# Patient Record
Sex: FEMALE | Race: WHITE | NOT HISPANIC OR LATINO | Employment: FULL TIME | URBAN - METROPOLITAN AREA
[De-identification: names, ages, dates, MRNs, and addresses within clinical notes are randomized per-mention and may not be internally consistent; named-entity substitution may affect disease eponyms.]

---

## 2017-01-19 RX ORDER — EZETIMIBE 10 MG/1
10 TABLET ORAL DAILY
Qty: 90 TAB | Refills: 3 | Status: SHIPPED | OUTPATIENT
Start: 2017-01-19 | End: 2017-11-29 | Stop reason: SDUPTHER

## 2017-03-27 DIAGNOSIS — F51.01 PRIMARY INSOMNIA: ICD-10-CM

## 2017-03-27 RX ORDER — ALPRAZOLAM 1 MG/1
1 TABLET ORAL NIGHTLY PRN
Qty: 30 TAB | Refills: 0 | Status: SHIPPED
Start: 2017-03-27 | End: 2017-03-28 | Stop reason: SDUPTHER

## 2017-03-27 NOTE — TELEPHONE ENCOUNTER
Was the patient seen in the last year in this department? Yes  refill 12/15/16    Does patient have an active prescription for medications requested? No     Received Request Via: Patient

## 2017-04-07 ENCOUNTER — HOSPITAL ENCOUNTER (OUTPATIENT)
Dept: RADIOLOGY | Facility: MEDICAL CENTER | Age: 57
End: 2017-04-07
Attending: FAMILY MEDICINE
Payer: COMMERCIAL

## 2017-04-07 DIAGNOSIS — R74.8 ELEVATED LIVER ENZYMES: ICD-10-CM

## 2017-04-07 PROCEDURE — 76705 ECHO EXAM OF ABDOMEN: CPT

## 2017-05-11 ENCOUNTER — APPOINTMENT (OUTPATIENT)
Dept: MEDICAL GROUP | Facility: LAB | Age: 57
End: 2017-05-11
Payer: COMMERCIAL

## 2017-06-02 RX ORDER — ALPRAZOLAM 1 MG/1
TABLET ORAL
Qty: 30 TAB | Refills: 0 | Status: SHIPPED
Start: 2017-06-02 | End: 2017-07-17 | Stop reason: SDUPTHER

## 2017-06-02 NOTE — TELEPHONE ENCOUNTER
Was the patient seen in the last year in this department? Yes  last Fill 3/27/17 #30    Does patient have an active prescription for medications requested? No     Received Request Via: Pharmacy

## 2017-06-07 ENCOUNTER — APPOINTMENT (OUTPATIENT)
Dept: MEDICAL GROUP | Facility: LAB | Age: 57
End: 2017-06-07
Payer: COMMERCIAL

## 2017-06-14 ENCOUNTER — HOSPITAL ENCOUNTER (OUTPATIENT)
Dept: LAB | Facility: MEDICAL CENTER | Age: 57
End: 2017-06-14
Attending: FAMILY MEDICINE
Payer: COMMERCIAL

## 2017-06-14 DIAGNOSIS — R74.8 ELEVATED LIVER ENZYMES: ICD-10-CM

## 2017-06-14 LAB
ALBUMIN SERPL BCP-MCNC: 4.3 G/DL (ref 3.2–4.9)
ALBUMIN/GLOB SERPL: 1.5 G/DL
ALP SERPL-CCNC: 49 U/L (ref 30–99)
ALT SERPL-CCNC: 94 U/L (ref 2–50)
ANION GAP SERPL CALC-SCNC: 10 MMOL/L (ref 0–11.9)
AST SERPL-CCNC: 68 U/L (ref 12–45)
BILIRUB SERPL-MCNC: 1.2 MG/DL (ref 0.1–1.5)
BUN SERPL-MCNC: 12 MG/DL (ref 8–22)
CALCIUM SERPL-MCNC: 9.5 MG/DL (ref 8.5–10.5)
CHLORIDE SERPL-SCNC: 104 MMOL/L (ref 96–112)
CO2 SERPL-SCNC: 24 MMOL/L (ref 20–33)
CREAT SERPL-MCNC: 0.77 MG/DL (ref 0.5–1.4)
GFR SERPL CREATININE-BSD FRML MDRD: >60 ML/MIN/1.73 M 2
GGT SERPL-CCNC: 67 U/L (ref 7–34)
GLOBULIN SER CALC-MCNC: 2.9 G/DL (ref 1.9–3.5)
GLUCOSE SERPL-MCNC: 91 MG/DL (ref 65–99)
HAV IGM SERPL QL IA: NEGATIVE
HBV CORE IGM SER QL: NEGATIVE
HBV SURFACE AG SER QL: NEGATIVE
HCV AB SER QL: NEGATIVE
POTASSIUM SERPL-SCNC: 3.8 MMOL/L (ref 3.6–5.5)
PROT SERPL-MCNC: 7.2 G/DL (ref 6–8.2)
SODIUM SERPL-SCNC: 138 MMOL/L (ref 135–145)

## 2017-06-14 PROCEDURE — 36415 COLL VENOUS BLD VENIPUNCTURE: CPT

## 2017-06-14 PROCEDURE — 80053 COMPREHEN METABOLIC PANEL: CPT

## 2017-06-14 PROCEDURE — 80074 ACUTE HEPATITIS PANEL: CPT

## 2017-06-14 PROCEDURE — 82977 ASSAY OF GGT: CPT

## 2017-06-15 DIAGNOSIS — E78.00 ELEVATED LDL CHOLESTEROL LEVEL: ICD-10-CM

## 2017-06-20 ENCOUNTER — OFFICE VISIT (OUTPATIENT)
Dept: MEDICAL GROUP | Facility: LAB | Age: 57
End: 2017-06-20
Payer: COMMERCIAL

## 2017-06-20 VITALS
BODY MASS INDEX: 26.83 KG/M2 | HEIGHT: 68 IN | HEART RATE: 88 BPM | OXYGEN SATURATION: 98 % | TEMPERATURE: 97.7 F | DIASTOLIC BLOOD PRESSURE: 64 MMHG | WEIGHT: 177 LBS | SYSTOLIC BLOOD PRESSURE: 108 MMHG | RESPIRATION RATE: 12 BRPM

## 2017-06-20 DIAGNOSIS — K75.81 STEATOHEPATITIS, NON-ALCOHOLIC: ICD-10-CM

## 2017-06-20 DIAGNOSIS — E78.2 MIXED HYPERLIPIDEMIA: ICD-10-CM

## 2017-06-20 DIAGNOSIS — Z00.00 HEALTH MAINTENANCE EXAMINATION: ICD-10-CM

## 2017-06-20 DIAGNOSIS — F10.90 ALCOHOL USE DISORDER: ICD-10-CM

## 2017-06-20 DIAGNOSIS — R74.8 ELEVATED LIVER ENZYMES: ICD-10-CM

## 2017-06-20 PROCEDURE — 99396 PREV VISIT EST AGE 40-64: CPT | Performed by: FAMILY MEDICINE

## 2017-06-20 RX ORDER — NALTREXONE HYDROCHLORIDE 50 MG/1
50 TABLET, FILM COATED ORAL DAILY
Qty: 30 TAB | Refills: 0 | Status: SHIPPED | OUTPATIENT
Start: 2017-06-20 | End: 2017-09-05

## 2017-06-20 NOTE — PROGRESS NOTES
Subjective:   Garret Crespo is a 57 y.o. female here today for   Chief Complaint   Patient presents with   • Results       1. Health maintenance examination  Pap: Last done 2015, up to date  Mammogram: Last done 12/2016, up to date  Colonoscopy: Last done 2012, up to date  Tetanus booster: Last done 2015  Pneumonia vaccine: Not done  Shingles vaccine: Not done   Flu vaccine: Done yearly   Diet: healthy   Exercise: rare   Vitamin D: Not taking    2. Mixed hyperlipidemia  This is chronic. Doing well.  Taking Zetia 10 mg daily as prescribed. No myalgias, GI upset, or other side effects from medication. Denies CP or stroke symptoms. Also taking a daily ASA. Last lipid panel 12/2017 and was reviewed with the patient.      3. Elevated liver enzymes  4. Steatohepatitis, non-alcoholic  5. Alcohol use disorder  This is chronic. Patient has had a long history of liver function tests have gone up and down. She has been an intermittent very heavy drinker. Until February, she was drinking 4 glasses of wine and hard alcohol every night. She decreased that to 2 glasses of wine every night. Sometimes more. She did not go through any withdrawal with this. She denies any jaundice, abdominal pain, or increasing abdominal circumference. She feels as though she is in a better place emotionally. She does still have very strong alcohol cravings.    Results for GARRET CRESPO (MRN [id number]) as of 6/20/2017 07:23   6/14/2017 16:00   Sodium 138   Potassium 3.8   Chloride 104   Co2 24   Anion Gap 10.0   Glucose 91   Bun 12   Creatinine 0.77   GFR If  >60   GFR If Non African American >60   Calcium 9.5   AST(SGOT) 68 (H)   ALT(SGPT) 94 (H)   Alkaline Phosphatase 49   Total Bilirubin 1.2   Albumin 4.3   Total Protein 7.2   Globulin 2.9   A-G Ratio 1.5   Gamma Gt 67 (H)   Hepatitis A Virus Ab, IgM Negative   Hepatitis B Surface Antigen Negative   Hepatitis B Cors Ab,IgM Negative   Hepatitis C Antibody Negative  "    4/7/17 US     Persistent marked increased hepatic echotexture which suggests fatty infiltration. Stable focal hypoechoic area surrounding the gallbladder fossa, which likely represents fatty sparing.          Current medicines (including changes today)  Current Outpatient Prescriptions   Medication Sig Dispense Refill   • naltrexone (DEPADE) 50 MG Tab Take 1 Tab by mouth every day. 30 Tab 0   • alprazolam (XANAX) 1 MG Tab TAKE ONE TABLET BY MOUTH AT BEDTIME AS NEEDED FOR SLEEP 30 Tab 0   • ezetimibe (ZETIA) 10 MG Tab Take 1 Tab by mouth every day. 90 Tab 3   • fluticasone (FLONASE) 50 MCG/ACT nasal spray Spray 1 Spray in nose every day. 16 g 11   • Flaxseed, Linseed, (FLAX SEEDS PO) Take  by mouth.     • vitamin D (CHOLECALCIFEROL) 1000 UNIT TABS Take 1,000 Units by mouth two times a day may change times on alt/days.       No current facility-administered medications for this visit.     She  has a past medical history of Hyperlipidemia (8/3/2011); Solar keratosis (8/3/2011); Dyspnea (3/22/2012); GERD (gastroesophageal reflux disease) (3/22/2012); Insomnia (12/5/2014); and Mixed hyperlipidemia (7/27/2015). She also has no past medical history of Breast cancer (CMS-HCC).    ROS   No fevers  No bowel changes  No LE edema       Objective:     Blood pressure 108/64, pulse 88, temperature 36.5 °C (97.7 °F), resp. rate 12, height 1.727 m (5' 7.99\"), weight 80.287 kg (177 lb), SpO2 98 %. Body mass index is 26.92 kg/(m^2).   Physical Exam:  Constitutional: Alert, no distress.  Skin: Warm, dry, good turgor, no rashes in visible areas.  Eye: Equal, round and reactive, conjunctiva clear, lids normal.  ENMT: Lips without lesions, good dentition, oropharynx clear.  Neck: Trachea midline, no masses, no thyromegaly. No cervical or supraclavicular lymphadenopathy  Respiratory: Unlabored respiratory effort, lungs clear to auscultation, no wheezes, no ronchi.  Cardiovascular: Normal S1, S2, RRR, no murmur, no edema.  Abdomen: " Soft, non-tender, no masses, no hepatosplenomegaly.  Psych: Alert and oriented x3, normal affect and mood.      Assessment and Plan:   The following treatment plan was discussed    1. Health maintenance examination  Restart vitamin D 2000 units daily  Diet and exercise discussed  Age appropriate counseling given  Healthcare maintenance is up-to-date    2. Mixed hyperlipidemia  Chronic, unstable repeat labs have been ordered and she will go get these done fasting  For now, continue Zetia  She does have a statin intolerance  Dietary recommendations given    3. Elevated liver enzymes  4. Steatohepatitis, non-alcoholic  Chronic, improving  Discussed the importance of alcohol cessation as this is the most likely cause. If these continue to remain elevated, further investigation will commence to look for other etiologies such as autoimmune hepatitis, viral causes.  Follow-up labs in 6 months    5. Alcohol use disorder  Chronic, improving  Prescription of naltrexone given to help with cravings  - naltrexone (DEPADE) 50 MG Tab; Take 1 Tab by mouth every day.  Dispense: 30 Tab; Refill: 0      Followup: Return in about 2 months (around 8/20/2017) for alcohol dependence, cholesterol .       This note was created using voice recognition software. I have made every reasonable attempt to correct errors, however, I do anticipate some grammatical errors.

## 2017-06-20 NOTE — MR AVS SNAPSHOT
"        Beatriz Crespo   2017 7:00 AM   Office Visit   MRN: [id number]    Department:  [facility]   Dept Phone:  [phone]    Description:  Female : 1960   Provider:  Sirisha Rodríguez M.D.           Reason for Visit     Results           Allergies as of 2017     Allergen Noted Reactions    Percocet [Oxycodone-Acetaminophen] 2011       Itch     Statins [Hmg-Coa-R Inhibitors] 10/06/2016         You were diagnosed with     Health maintenance examination   [429132]       Mixed hyperlipidemia   [272.2.ICD-9-CM]       Elevated liver enzymes   [084309]       Steatohepatitis, non-alcoholic   [266001]       Alcohol use disorder   [5810580]         Vital Signs     Blood Pressure Pulse Temperature Respirations Height Weight    108/64 mmHg 88 36.5 °C (97.7 °F) 12 1.727 m (5' 7.99\") 80.287 kg (177 lb)    Body Mass Index Oxygen Saturation Smoking Status             26.92 kg/m2 98% Former Smoker         Basic Information     Date Of Birth Sex Race Ethnicity Preferred Language    1960 Female White Non- English      Your appointments     Aug 23, 2017  7:00 AM   Established Patient with Sirisha Rodríguez M.D.   [facility] (--)    [address] [address] [id number] [phone]           You will be receiving a confirmation call a few days before your appointment from our automated call confirmation system.              Problem List              ICD-10-CM Priority Class Noted - Resolved    Solar keratosis L57.0, X32.XXXA   8/3/2011 - Present    Preventative health care Z00.00   2011 - Present    Dyspnea R06.00   3/22/2012 - Present    GERD (gastroesophageal reflux disease) K21.9   3/22/2012 - Present    Chronic sinus complaints R09.89   2013 - Present    Steatohepatitis, non-alcoholic K75.81   2014 - Present    Impaired fasting glucose R73.01   3/3/2014 - Present    Insomnia G47.00   2014 - Present   " Menopause Z78.0   12/6/2014 - Present    Lower back pain M54.5   1/5/2015 - Present    Eustachian tube dysfunction H69.80   1/5/2015 - Present    Statin intolerance Z78.9   7/27/2015 - Present    Mixed hyperlipidemia E78.2   7/27/2015 - Present    Moderate single current episode of major depressive disorder (CMS-HCC) F32.1   10/12/2016 - Present    Elevated liver enzymes R74.8   12/15/2016 - Present    Alcohol use disorder F10.99   12/15/2016 - Present      Health Maintenance        Date Due Completion Dates    PAP SMEAR 7/13/2018 7/13/2015, 6/26/2014 (Done)    Override on 6/26/2014: Done    MAMMOGRAM 12/2/2018 12/2/2016, 7/13/2015, 7/3/2014, 6/25/2014, 4/22/2013, 4/3/2012    COLONOSCOPY 4/6/2022 4/6/2012 (Done)    Override on 4/6/2012: Done    IMM DTaP/Tdap/Td Vaccine (2 - Td) 9/9/2025 9/9/2015            Current Immunizations     Tdap Vaccine 9/9/2015      Below and/or attached are the medications your provider expects you to take. Review all of your home medications and newly ordered medications with your provider and/or pharmacist. Follow medication instructions as directed by your provider and/or pharmacist. Please keep your medication list with you and share with your provider. Update the information when medications are discontinued, doses are changed, or new medications (including over-the-counter products) are added; and carry medication information at all times in the event of emergency situations     Allergies:  PERCOCET - (reactions not documented)     STATINS - (reactions not documented)               Medications  Valid as of: June 20, 2017 -  7:21 AM    Generic Name Brand Name Tablet Size Instructions for use    ALPRAZolam (Tab) XANAX 1 MG TAKE ONE TABLET BY MOUTH AT BEDTIME AS NEEDED FOR SLEEP        Cholecalciferol (Tab) cholecalciferol 1000 UNIT Take 1,000 Units by mouth two times a day may change times on alt/days.        Ezetimibe (Tab) ZETIA 10 MG Take 1 Tab by mouth every day.        Flaxseed  (Linseed)   Take  by mouth.        Fluticasone Propionate (Suspension) FLONASE 50 MCG/ACT Spray 1 Spray in nose every day.        Naltrexone HCl (Tab) DEPADE 50 MG Take 1 Tab by mouth every day.        .                 Medicines prescribed today were sent to:     [facility] [address] [place] [address], [place] - [address]    [address] [place] [address]    Phone: [phone] Fax: [phone]    Open 24 Hours?: No      Medication refill instructions:       If your prescription bottle indicates you have medication refills left, it is not necessary to call your provider’s office. Please contact your pharmacy and they will refill your medication.    If your prescription bottle indicates you do not have any refills left, you may request refills at any time through one of the following ways: The online [email or web address] system (except Urgent Care), by calling your provider’s office, or by asking your pharmacy to contact your provider’s office with a refill request. Medication refills are processed only during regular business hours and may not be available until the next business day. Your provider may request additional information or to have a follow-up visit with you prior to refilling your medication.   *Please Note: Medication refills are assigned a new Rx number when refilled electronically. Your pharmacy may indicate that no refills were authorized even though a new prescription for the same medication is available at the pharmacy. Please request the medicine by name with the pharmacy before contacting your provider for a refill.           [email or web address] Access Code: Activation code not generated  Current [email or web address] Status: Active

## 2017-07-17 RX ORDER — ALPRAZOLAM 1 MG/1
TABLET ORAL
Qty: 30 TAB | Refills: 0 | Status: CANCELLED
Start: 2017-07-17

## 2017-07-17 RX ORDER — ALPRAZOLAM 1 MG/1
TABLET ORAL
Qty: 30 TAB | Refills: 0 | Status: SHIPPED
Start: 2017-07-17 | End: 2017-10-18 | Stop reason: SDUPTHER

## 2017-07-17 NOTE — TELEPHONE ENCOUNTER
Was the patient seen in the last year in this department? Yes  refill 6/2/17    Does patient have an active prescription for medications requested? No     Received Request Via: Patient

## 2017-09-05 ENCOUNTER — HOSPITAL ENCOUNTER (EMERGENCY)
Facility: MEDICAL CENTER | Age: 57
End: 2017-09-05
Attending: EMERGENCY MEDICINE
Payer: COMMERCIAL

## 2017-09-05 ENCOUNTER — APPOINTMENT (OUTPATIENT)
Dept: RADIOLOGY | Facility: MEDICAL CENTER | Age: 57
End: 2017-09-05
Attending: EMERGENCY MEDICINE
Payer: COMMERCIAL

## 2017-09-05 VITALS
HEART RATE: 89 BPM | SYSTOLIC BLOOD PRESSURE: 113 MMHG | WEIGHT: 173.28 LBS | RESPIRATION RATE: 18 BRPM | HEIGHT: 68 IN | TEMPERATURE: 97.7 F | BODY MASS INDEX: 26.26 KG/M2 | DIASTOLIC BLOOD PRESSURE: 83 MMHG

## 2017-09-05 DIAGNOSIS — S09.90XA CHI (CLOSED HEAD INJURY), INITIAL ENCOUNTER: ICD-10-CM

## 2017-09-05 DIAGNOSIS — R42 DIZZINESS: ICD-10-CM

## 2017-09-05 DIAGNOSIS — S09.90XA HEAD INJURY DUE TO TRAUMA, INITIAL ENCOUNTER: Primary | ICD-10-CM

## 2017-09-05 DIAGNOSIS — R56.9 SEIZURE (HCC): ICD-10-CM

## 2017-09-05 DIAGNOSIS — R26.89 BALANCE PROBLEMS: ICD-10-CM

## 2017-09-05 LAB
ALBUMIN SERPL BCP-MCNC: 4 G/DL (ref 3.2–4.9)
ALBUMIN/GLOB SERPL: 1.3 G/DL
ALP SERPL-CCNC: 50 U/L (ref 30–99)
ALT SERPL-CCNC: 78 U/L (ref 2–50)
ANION GAP SERPL CALC-SCNC: 10 MMOL/L (ref 0–11.9)
AST SERPL-CCNC: 59 U/L (ref 12–45)
BASOPHILS # BLD AUTO: 0.6 % (ref 0–1.8)
BASOPHILS # BLD: 0.03 K/UL (ref 0–0.12)
BILIRUB SERPL-MCNC: 0.9 MG/DL (ref 0.1–1.5)
BUN SERPL-MCNC: 14 MG/DL (ref 8–22)
CALCIUM SERPL-MCNC: 9.4 MG/DL (ref 8.4–10.2)
CHLORIDE SERPL-SCNC: 106 MMOL/L (ref 96–112)
CO2 SERPL-SCNC: 22 MMOL/L (ref 20–33)
CREAT SERPL-MCNC: 0.87 MG/DL (ref 0.5–1.4)
EOSINOPHIL # BLD AUTO: 0.13 K/UL (ref 0–0.51)
EOSINOPHIL NFR BLD: 2.5 % (ref 0–6.9)
ERYTHROCYTE [DISTWIDTH] IN BLOOD BY AUTOMATED COUNT: 39.3 FL (ref 35.9–50)
GFR SERPL CREATININE-BSD FRML MDRD: >60 ML/MIN/1.73 M 2
GLOBULIN SER CALC-MCNC: 3.1 G/DL (ref 1.9–3.5)
GLUCOSE SERPL-MCNC: 122 MG/DL (ref 65–99)
HCT VFR BLD AUTO: 44.4 % (ref 37–47)
HGB BLD-MCNC: 15.5 G/DL (ref 12–16)
IMM GRANULOCYTES # BLD AUTO: 0.02 K/UL (ref 0–0.11)
IMM GRANULOCYTES NFR BLD AUTO: 0.4 % (ref 0–0.9)
INR PPP: 0.89 (ref 0.87–1.13)
LYMPHOCYTES # BLD AUTO: 1.67 K/UL (ref 1–4.8)
LYMPHOCYTES NFR BLD: 32.5 % (ref 22–41)
MCH RBC QN AUTO: 32.8 PG (ref 27–33)
MCHC RBC AUTO-ENTMCNC: 34.9 G/DL (ref 33.6–35)
MCV RBC AUTO: 93.9 FL (ref 81.4–97.8)
MONOCYTES # BLD AUTO: 0.46 K/UL (ref 0–0.85)
MONOCYTES NFR BLD AUTO: 8.9 % (ref 0–13.4)
NEUTROPHILS # BLD AUTO: 2.83 K/UL (ref 2–7.15)
NEUTROPHILS NFR BLD: 55.1 % (ref 44–72)
NRBC # BLD AUTO: 0 K/UL
NRBC BLD AUTO-RTO: 0 /100 WBC
PLATELET # BLD AUTO: 215 K/UL (ref 164–446)
PMV BLD AUTO: 10.1 FL (ref 9–12.9)
POTASSIUM SERPL-SCNC: 4.2 MMOL/L (ref 3.6–5.5)
PROT SERPL-MCNC: 7.1 G/DL (ref 6–8.2)
PROTHROMBIN TIME: 11.9 SEC (ref 12–14.6)
RBC # BLD AUTO: 4.73 M/UL (ref 4.2–5.4)
SODIUM SERPL-SCNC: 138 MMOL/L (ref 135–145)
TROPONIN I SERPL-MCNC: <0.02 NG/ML (ref 0–0.04)
WBC # BLD AUTO: 5.1 K/UL (ref 4.8–10.8)

## 2017-09-05 PROCEDURE — 85025 COMPLETE CBC W/AUTO DIFF WBC: CPT

## 2017-09-05 PROCEDURE — 85610 PROTHROMBIN TIME: CPT

## 2017-09-05 PROCEDURE — 93005 ELECTROCARDIOGRAM TRACING: CPT | Performed by: EMERGENCY MEDICINE

## 2017-09-05 PROCEDURE — 700117 HCHG RX CONTRAST REV CODE 255: Performed by: EMERGENCY MEDICINE

## 2017-09-05 PROCEDURE — 84484 ASSAY OF TROPONIN QUANT: CPT

## 2017-09-05 PROCEDURE — 80053 COMPREHEN METABOLIC PANEL: CPT

## 2017-09-05 PROCEDURE — 70496 CT ANGIOGRAPHY HEAD: CPT

## 2017-09-05 PROCEDURE — 99284 EMERGENCY DEPT VISIT MOD MDM: CPT

## 2017-09-05 RX ADMIN — IOHEXOL 100 ML: 350 INJECTION, SOLUTION INTRAVENOUS at 10:30

## 2017-09-05 ASSESSMENT — PAIN SCALES - WONG BAKER
WONGBAKER_NUMERICALRESPONSE: DOESN'T HURT AT ALL
WONGBAKER_NUMERICALRESPONSE: DOESN'T HURT AT ALL

## 2017-09-05 NOTE — ED NOTES
Pt amb to rm#3; c/o dizziness x5d. Pt staes that last Friday she experienced a new onset seizure while grocery shopping in [place]. Pt seen at local hospital at that time. Pt states cont dizziness post seizure.

## 2017-09-05 NOTE — DISCHARGE INSTRUCTIONS
Head Injury, Adult  You have received a head injury. It does not appear serious at this time. Headaches and vomiting are common following head injury. It should be easy to awaken from sleeping. Sometimes it is necessary for you to stay in the emergency department for a while for observation. Sometimes admission to the hospital may be needed. After injuries such as yours, most problems occur within the first 24 hours, but side effects may occur up to 7-10 days after the injury. It is important for you to carefully monitor your condition and contact your health care provider or seek immediate medical care if there is a change in your condition.  WHAT ARE THE TYPES OF HEAD INJURIES?  Head injuries can be as minor as a bump. Some head injuries can be more severe. More severe head injuries include:  · A jarring injury to the brain (concussion).  · A bruise of the brain (contusion). This mean there is bleeding in the brain that can cause swelling.  · A cracked skull (skull fracture).  · Bleeding in the brain that collects, clots, and forms a bump (hematoma).  WHAT CAUSES A HEAD INJURY?  A serious head injury is most likely to happen to someone who is in a car wreck and is not wearing a seat belt. Other causes of major head injuries include bicycle or motorcycle accidents, sports injuries, and falls.  HOW ARE HEAD INJURIES DIAGNOSED?  A complete history of the event leading to the injury and your current symptoms will be helpful in diagnosing head injuries. Many times, pictures of the brain, such as CT or MRI are needed to see the extent of the injury. Often, an overnight hospital stay is necessary for observation.   WHEN SHOULD I SEEK IMMEDIATE MEDICAL CARE?   You should get help right away if:  · You have confusion or drowsiness.  · You feel sick to your stomach (nauseous) or have continued, forceful vomiting.  · You have dizziness or unsteadiness that is getting worse.  · You have severe, continued headaches not  relieved by medicine. Only take over-the-counter or prescription medicines for pain, fever, or discomfort as directed by your health care provider.  · You do not have normal function of the arms or legs or are unable to walk.  · You notice changes in the black spots in the center of the colored part of your eye (pupil).  · You have a clear or bloody fluid coming from your nose or ears.  · You have a loss of vision.  During the next 24 hours after the injury, you must stay with someone who can watch you for the warning signs. This person should contact local emergency services (911 in the [place] if you have seizures, you become unconscious, or you are unable to wake up.  HOW CAN I PREVENT A HEAD INJURY IN THE FUTURE?  The most important factor for preventing major head injuries is avoiding motor vehicle accidents.  To minimize the potential for damage to your head, it is crucial to wear seat belts while riding in motor vehicles. Wearing helmets while bike riding and playing collision sports (like football) is also helpful. Also, avoiding dangerous activities around the house will further help reduce your risk of head injury.   WHEN CAN I RETURN TO NORMAL ACTIVITIES AND ATHLETICS?  You should be reevaluated by your health care provider before returning to these activities. If you have any of the following symptoms, you should not return to activities or contact sports until 1 week after the symptoms have stopped:  · Persistent headache.  · Dizziness or vertigo.  · Poor attention and concentration.  · Confusion.  · Memory problems.  · Nausea or vomiting.  · Fatigue or tire easily.  · Irritability.  · Intolerant of bright lights or loud noises.  · Anxiety or depression.  · Disturbed sleep.  MAKE SURE YOU:   · Understand these instructions.  · Will watch your condition.  · Will get help right away if you are not doing well or get worse.     This information is not intended to replace advice given to you by your health  care provider. Make sure you discuss any questions you have with your health care provider.     Document Released: 12/18/2006 Document Revised: 01/08/2016 Document Reviewed: 08/25/2014  [employer] Interactive Patient Education ©2016 [employer]        Ataxia  You have an unsteady walk called ataxia. Your condition may require further tests.  Ataxia can be caused by:  · Neurological conditions.  · Infections.  · Physical exhaustion.  · Internal bleeding.  · Alcohol intoxication, or medication side effects.  · Problems with circulation, blood pressure, and heart disease can also make you unsteady.  Laboratory and X-ray tests may be needed.   Treatment for now:  · Get plenty of rest and eat a nutritious diet over the next weeks.  · Avoid alcohol.  · If you become very unsteady, dizzy, nauseated, or feel like you are going to faint, lie down flat right away.  · Wait until all your symptoms pass before you get up again.  SEEK IMMEDIATE MEDICAL CARE IF:  · You develop severe unsteadiness, headache, chest pain, or abdominal pain.  · You have weakness or numbness on one side of your body.  · You have problems with your vision.  · You develop confusion or difficulty speaking.  · You have a fever, chills, or an irregular heartbeat or a very fast pulse.  MAKE SURE YOU:   · Understand these instructions.  · Will watch your condition.  · Will get help right away if you are not doing well or get worse.  Document Released: 12/18/2006 Document Revised: 03/11/2013 Document Reviewed: 06/05/2008  [employer]® Patient Information ©2014 [employer].

## 2017-09-05 NOTE — ED PROVIDER NOTES
"ED Provider Note    ED Provider Note      Primary care provider: Sirisha Rodríguez M.D.  Means of arrival: POV  History obtained from: Patient  History limited by: None    CHIEF COMPLAINT  Chief Complaint   Patient presents with   • Dizziness       HPI  Beatriz Crespo is a 57 y.o. female who presents to the Emergency Department with a chief complaint of dizziness. Patient states that last Friday, she was in [place] she was [place] all day and after she left at approximately 1 PM she stopped at a store to get something to eat she was buying some lettuce and the next thing she knows she was riding in an ambulance. She was reportedly told that she passed out at the grocery store hit her head and had a seizure. She was hospitalized in [place]. She underwent CT scanning which was reportedly normal. She returned home on Saturday. She reports that Sunday and Monday were normal. This morning, she woke up feeling dizzy and off-balance prompting her to come to the emergency department. She complains he describes it as lightheadedness. She does not have a sense of the room spinning. She denies any nausea, vomiting or diarrhea. No urinary complaints. She denies having a headache fever, URI symptoms. She points in a [place] around the top of her head noting that that area feels \"congested as if it is swirling around\" but does not have a headache or pain complaints to the area. She does still have some tenderness to her posterior scalp where she apparently had a hematoma. Patient's overall healthy. She takes Xanax for sleep and occasionally omeprazole for GERD. She occasionally smokes, noting she smokes about 2 cigarettes per day. She takes 2 alcoholic drinks per day and denies any illicit drug use. She has no allergies. She denies any visual changes, numbness or tingling or inflated weaknesses. There is been no change in her speech.    REVIEW OF SYSTEMS  ROS    PAST MEDICAL HISTORY   has a past medical history " "of Dyspnea (3/22/2012); GERD (gastroesophageal reflux disease) (3/22/2012); Hyperlipidemia (8/3/2011); Insomnia (12/5/2014); Mixed hyperlipidemia (7/27/2015); and Solar keratosis (8/3/2011).    SURGICAL HISTORY   has a past surgical history that includes primary c section; shoulder arthroscopy; and lysis adhesions gyn.    SOCIAL HISTORY  Social History   Substance Use Topics   • Smoking status: Former Smoker     Packs/day: 0.02     Years: 1.00     Types: Cigarettes     Quit date: 2/20/2017   • Smokeless tobacco: Never Used   • Alcohol use Yes      Comment: Occ       History   Drug Use No       FAMILY HISTORY  Family History   Problem Relation Age of Onset   • Hypertension Father    • Hypertension Mother    • Cancer Paternal Grandmother      Breast cancer mid 70s   • Hyperlipidemia Mother    • Diabetes Mother      Dm type 1   • Hyperlipidemia Father        CURRENT MEDICATIONS  Home Medications     Reviewed by Katey Leyva (Pharmacy Tech) on 09/05/17 at 0731  Med List Status: Complete   Medication Last Dose Status   alprazolam (XANAX) 1 MG Tab 9/4/2017 Active   ezetimibe (ZETIA) 10 MG Tab 9/4/2017 Active                ALLERGIES  Allergies   Allergen Reactions   • Percocet [Oxycodone-Acetaminophen]      Itch    • Statins [Hmg-Coa-R Inhibitors]        PHYSICAL EXAM  VITAL SIGNS: /83   Pulse 89   Temp 36.5 °C (97.7 °F)   Resp 18   Ht 1.727 m (5' 8\")   Wt 78.6 kg (173 lb 4.5 oz)   BMI 26.35 kg/m²   Vitals reviewed.  Constitutional: Patient is oriented to person, place, and time. Appears well-developed and well-nourished. No distress.    Head: Normocephalic. Diffuse tenderness to her posterior scalp, overlying skin is intact. No palpable fractures or step-offs.  Ears: Normal external ears bilaterally.   Mouth/Throat: Oropharynx is clear and moist, no exudates.   Eyes: Conjunctivae are normal. Pupils are equal, round, and reactive to light.   Neck: Normal range of motion. Neck " supple.  Cardiovascular: Normal rate, regular rhythm and normal heart sounds. Normal peripheral pulses.  Pulmonary/Chest: Effort normal and breath sounds normal. No respiratory distress, no wheezes, rhonchi, or rales. No chest wall tenderness.  Abdominal: Soft. Bowel sounds are normal. There is no tenderness, rebound or guarding, or peritoneal signs, no masses. No CVA tenderness.  Musculoskeletal: No edema and no tenderness.   Lymphadenopathy: No cervical adenopathy.   Neurological: No focal deficits.  CN II through XII intact. Normal motor and sensory exam. Normal speech. Normal cognition. No nystagmus. EOMI.  Skin: Skin is warm and dry. No erythema. No pallor.   Psychiatric: Patient has a normal mood and affect.     LABS  Results for orders placed or performed during the hospital encounter of 09/05/17   CBC WITH DIFFERENTIAL   Result Value Ref Range    WBC 5.1 4.8 - 10.8 K/uL    RBC 4.73 4.20 - 5.40 M/uL    Hemoglobin 15.5 12.0 - 16.0 g/dL    Hematocrit 44.4 37.0 - 47.0 %    MCV 93.9 81.4 - 97.8 fL    MCH 32.8 27.0 - 33.0 pg    MCHC 34.9 33.6 - 35.0 g/dL    RDW 39.3 35.9 - 50.0 fL    Platelet Count 215 164 - 446 K/uL    MPV 10.1 9.0 - 12.9 fL    Neutrophils-Polys 55.10 44.00 - 72.00 %    Lymphocytes 32.50 22.00 - 41.00 %    Monocytes 8.90 0.00 - 13.40 %    Eosinophils 2.50 0.00 - 6.90 %    Basophils 0.60 0.00 - 1.80 %    Immature Granulocytes 0.40 0.00 - 0.90 %    Nucleated RBC 0.00 /100 WBC    Neutrophils (Absolute) 2.83 2.00 - 7.15 K/uL    Lymphs (Absolute) 1.67 1.00 - 4.80 K/uL    Monos (Absolute) 0.46 0.00 - 0.85 K/uL    Eos (Absolute) 0.13 0.00 - 0.51 K/uL    Baso (Absolute) 0.03 0.00 - 0.12 K/uL    Immature Granulocytes (abs) 0.02 0.00 - 0.11 K/uL    NRBC (Absolute) 0.00 K/uL   COMP METABOLIC PANEL   Result Value Ref Range    Sodium 138 135 - 145 mmol/L    Potassium 4.2 3.6 - 5.5 mmol/L    Chloride 106 96 - 112 mmol/L    Co2 22 20 - 33 mmol/L    Anion Gap 10.0 0.0 - 11.9    Glucose 122 (H) 65 - 99 mg/dL     Bun 14 8 - 22 mg/dL    Creatinine 0.87 0.50 - 1.40 mg/dL    Calcium 9.4 8.4 - 10.2 mg/dL    AST(SGOT) 59 (H) 12 - 45 U/L    ALT(SGPT) 78 (H) 2 - 50 U/L    Alkaline Phosphatase 50 30 - 99 U/L    Total Bilirubin 0.9 0.1 - 1.5 mg/dL    Albumin 4.0 3.2 - 4.9 g/dL    Total Protein 7.1 6.0 - 8.2 g/dL    Globulin 3.1 1.9 - 3.5 g/dL    A-G Ratio 1.3 g/dL   TROPONIN   Result Value Ref Range    Troponin I <0.02 0.00 - 0.04 ng/mL   PROTHROMBIN TIME   Result Value Ref Range    PT 11.9 (L) 12.0 - 14.6 sec    INR 0.89 0.87 - 1.13   ESTIMATED GFR   Result Value Ref Range    GFR If African American >60 >60 mL/min/1.73 m 2    GFR If Non African American >60 >60 mL/min/1.73 m 2   EKG (NOW)   Result Value Ref Range    Report       [facility] Emergency Dept.    Test Date:  2017  Pt Name:    GARRET OSBORNE           Department: [facility]  MRN:        [id number]                      [facility]  Gender:     F                            Technician: [id number]  :        1960                   Requested By:NADIA MONTENEGRO  Order #:    [id number]                    Reading MD:    Measurements  Intervals                                Axis  Rate:       71                           P:          18  KS:         126                          QRS:        64  QRSD:       92                           T:          28  QT:         400  QTc:        435    Interpretive Statements  Sinus rhythm  Compared to ECG 2015 15:58:59  No significant changes         All labs reviewed by me.    EKG Interpretation  Interpreted by me    Rhythm: normal sinus   Rate: 71  Axis: normal  Ectopy: none  Conduction: normal  ST Segments: no acute change  T Waves: no acute change  Q Waves: none    Clinical Impression: no acute changes noted today or on prior EKG from 2015.    RADIOLOGY  CT-CTA HEAD WITH & W/O-POST PROCESS   Final Result      No evidence of intracranial vascular occlusion or aneurysm.        The radiologist's  interpretation of all radiological studies have been reviewed by me.    COURSE & MEDICAL DECISION MAKING  Pertinent Labs & Imaging studies reviewed. (See chart for details)    Obtained and reviewed past medical records.Patient's last ED visit was in 2015 for syncope. She was resuscitated with IV fluids and felt well. She was discharged to home.    7:29 AM - Patient seen and examined at bedside. Overall, the patient's well-appearing and nontoxic. She denies pain although she has a vague complaint of congestion to her head. She denies any neurologic deficits. She plans to follow up with primary care doctor as well as with neurology but has not had a chance to her she just returned on Saturday and then there was the holiday weekend. She presents today because she's felt so off balance after feeling well for the last few days. Discussed possibility of a delayed injury or vascular injury given this fall and have advised reimaging for this purpose. We'll also assess for possible electrolyte disturbance or dehydration. An IV will be established.     Patient was reevaluated. I had the patient walk. She has a really rather steady gait although feels off balance. We discussed possibility of postconcussive syndrome which I suspect is what is happening here I also discussed admission to the hospital for further imaging possibly an MRI. She is not amenable to this plan but she would like to have an MRI as an outpatient and then subsequently follow up with neurology. I called our  who was able to schedule the patient to have an MRI on Thursday, September 7 at 7:30 in the morning. This works for the patient. She is given strict return precautions. I've given her precautions regarding avoiding head injury in the near future until she is healed. She is also given strict return precautions regarding worsening of symptoms or new concerning symptoms. Again, patient would like to go home. I think this is a reasonable plan of  care at this time. She will have an MRI done as an outpatient in 2 days and follow up with her PCP and neurology from that point. She is discharged in stable condition.      FINAL IMPRESSION  1. Dizziness    2. Balance problems    3. CHI (closed head injury), initial encounter

## 2017-09-07 ENCOUNTER — HOSPITAL ENCOUNTER (OUTPATIENT)
Dept: RADIOLOGY | Facility: MEDICAL CENTER | Age: 57
End: 2017-09-07
Attending: EMERGENCY MEDICINE
Payer: COMMERCIAL

## 2017-09-07 DIAGNOSIS — R56.9 SEIZURE (HCC): ICD-10-CM

## 2017-09-07 DIAGNOSIS — S09.90XA HEAD INJURY DUE TO TRAUMA, INITIAL ENCOUNTER: ICD-10-CM

## 2017-09-07 PROCEDURE — 70551 MRI BRAIN STEM W/O DYE: CPT

## 2017-09-07 NOTE — DISCHARGE INSTRUCTIONS
MRI ADULT DISCHARGE INSTRUCTIONS    You have been medicated today for your scan. Please follow the instructions below to ensure your safe recovery. If you have any questions or problems, feel free to call us at [phone] or [phone].     1.   Have someone stay with you to assist you as needed.    2.   Do not drive or operate any mechanical devices.    3.   Do not perform any activity that requires concentration. Make no major decisions over the next 24 hours.     4.   Be careful changing positions from laying down to sitting or standing, as you may become dizzy.     5.   Do not drink alcohol for 48 hours.    6.   There are no restrictions for eating your normal meals. Drink fluids.    7.   You may continue your usual medications for pain, tranquilizers, muscle relaxants or sedatives when awake.     8.   Tomorrow, you may continue your normal daily activities.     9.   Pressure dressing on 10 - 15 minutes. If swelling or bleeding occurs when removed, continue placing direct pressure on injection site for another 5 minutes, or until bleeding stops.     I have been informed of and understand the above discharge instructions.

## 2017-09-14 ENCOUNTER — HOSPITAL ENCOUNTER (OUTPATIENT)
Dept: LAB | Facility: MEDICAL CENTER | Age: 57
End: 2017-09-14
Attending: FAMILY MEDICINE
Payer: COMMERCIAL

## 2017-09-14 DIAGNOSIS — E78.00 ELEVATED LDL CHOLESTEROL LEVEL: ICD-10-CM

## 2017-09-14 LAB
CHOLEST SERPL-MCNC: 220 MG/DL (ref 100–199)
HDLC SERPL-MCNC: 49 MG/DL
LDLC SERPL CALC-MCNC: 119 MG/DL
TRIGL SERPL-MCNC: 262 MG/DL (ref 0–149)

## 2017-09-14 PROCEDURE — 36415 COLL VENOUS BLD VENIPUNCTURE: CPT

## 2017-09-14 PROCEDURE — 80061 LIPID PANEL: CPT

## 2017-09-18 ENCOUNTER — OFFICE VISIT (OUTPATIENT)
Dept: MEDICAL GROUP | Facility: LAB | Age: 57
End: 2017-09-18
Payer: COMMERCIAL

## 2017-09-18 VITALS
RESPIRATION RATE: 12 BRPM | DIASTOLIC BLOOD PRESSURE: 78 MMHG | BODY MASS INDEX: 26.01 KG/M2 | HEIGHT: 68 IN | WEIGHT: 171.6 LBS | TEMPERATURE: 98.2 F | HEART RATE: 81 BPM | OXYGEN SATURATION: 95 % | SYSTOLIC BLOOD PRESSURE: 98 MMHG

## 2017-09-18 DIAGNOSIS — E78.2 MIXED HYPERLIPIDEMIA: ICD-10-CM

## 2017-09-18 DIAGNOSIS — T67.1XXD HEAT SYNCOPE, SUBSEQUENT ENCOUNTER: ICD-10-CM

## 2017-09-18 DIAGNOSIS — R56.9 SEIZURE (HCC): ICD-10-CM

## 2017-09-18 PROCEDURE — 99214 OFFICE O/P EST MOD 30 MIN: CPT | Performed by: NURSE PRACTITIONER

## 2017-09-18 ASSESSMENT — PATIENT HEALTH QUESTIONNAIRE - PHQ9: CLINICAL INTERPRETATION OF PHQ2 SCORE: 0

## 2017-09-18 NOTE — LETTER
[facility]  Sirisha Rodríguez M.D.  [address]  [place] [address]  Fax: [phone]   Authorization for Release/Disclosure of   Protected Health Information   Name: GARRET OSBORNE : 1960 SSN: [id number]   Address: [address] [place] [address]  [place] [address] Phone:    [phone] (home) [phone] (work)   I authorize the entity listed below to release/disclose the PHI below to:   [facility]/Sirisha Rodríguez M.D. and VIMAL [facility]   Provider or Entity Name:  [facility], [place], [place] Phone:      Fax:     Reason for request: continuity of care   Information to be released:    [  ] LAST COLONOSCOPY,  including any PATH REPORT and follow-up  [  ] LAST FIT/COLOGUARD RESULT [  ] LAST DEXA  [  ] LAST MAMMOGRAM  [  ] LAST PAP  [  ] LAST LABS [  ] RETINA EXAM REPORT  [  ] IMMUNIZATION RECORDS  [ x ] Release all info      [  ] Check here and initial the line next to each item to release ALL health information INCLUDING  _____ Care and treatment for drug and / or alcohol abuse  _____ HIV testing, infection status, or AIDS  _____ Genetic Testing    DATES OF SERVICE OR TIME PERIOD TO BE DISCLOSED: _____________  I understand and acknowledge that:  * This Authorization may be revoked at any time by you in writing, except if your health information has already been used or disclosed.  * Your health information that will be used or disclosed as a result of you signing this authorization could be re-disclosed by the recipient. If this occurs, your re-disclosed health information may no longer be protected by State or Federal laws.  * You may refuse to sign this Authorization. Your refusal will not affect your ability to obtain treatment.  * This Authorization becomes effective upon signing and will  on (date) __________.      If no date is indicated, this Authorization will  one (1) year from the signature date.    Name: Garret EAGLE  Cherie    Signature:   Date:     9/18/2017       PLEASE FAX REQUESTED RECORDS BACK TO: [phone]

## 2017-09-18 NOTE — ASSESSMENT & PLAN NOTE
Doing fine on Zetia 10 mg.  Admits to drinking too much.  Exercising daily.  Smokes 0-2 cig per day.    Component      Latest Ref Rng & Units 7/10/2015 12/12/2016 9/14/2017           3:26 PM 12:27 PM  1:35 PM   Cholesterol,Tot      100 - 199 mg/dL 336 (H) 327 (H) 220 (H)   Triglycerides      0 - 149 mg/dL 225 (H) 173 (H) 262 (H)   HDL      >=40 mg/dL 63 51 49   LDL      <100 mg/dL 228 (H) 241 (H) 119 (H)

## 2017-09-18 NOTE — PROGRESS NOTES
Chief Complaint   Patient presents with   • Results     labs & MRI        HPI  57-year-old established female here to follow-up on an ER visit in [place] on September 1 and an ER visit here in [place] on September 5, the first for syncope/possible seizure activity and the second for dizziness. She was in a grocery store in [place] after being at [employer] all day and passed out. She mentions numerous times that it was extremely hot all day long in [place] although she states that she was staying very well hydrated. She reports that she had negative workup in [place] ER, felt slightly dizzy for a few days afterwards and when she returned here her dizziness persisted so she went to the emergency department on September 5 where CT was repeated. MRI was done outpatient 2 days later which was negative. She has no history of seizure disorders or syncope.  + smoker. Drinks anywhere from 0-4 drinks at night. Going through a lot of stress in her marriage. Exercises regularly.   No other associated symptoms.      MRI result:  1.  Minimal periventricular and subcortical white matter changes consistent with chronic microvascular ischemic gliosis.    2.  Otherwise unremarkable MRI scan of the brain without.    CT result:  negative     Since 9/5/2017, only dizzy for a minute first thing in the morning.  No headaches or vision changes.  No difficulty with speech or gait.  Feeling really well. Denies any new concerns or complaints. Trying to cut down on her alcohol. Reports that she did not drink the night before or the day of her ER visit on September 1.    #2-Mixed hyperlipidemia  Doing fine on Zetia 10 mg.  Admits to drinking too much.  Exercising daily.  Smokes 0-2 cig per day.    Component      Latest Ref Rng & Units 7/10/2015 12/12/2016 9/14/2017           3:26 PM 12:27 PM  1:35 PM   Cholesterol,Tot      100 - 199 mg/dL 336 (H) 327 (H) 220 (H)   Triglycerides      0 - 149 mg/dL 225 (H) 173 (H) 262 (H)   HDL      >=40  "mg/dL 63 51 49   LDL      <100 mg/dL 228 (H) 241 (H) 119 (H)       Past medical, surgical, family, and social history is reviewed and updated in Epic chart by me today.   Medications and allergies reviewed and updated in Epic chart by me today.     ROS:   As documented in history of present illness above    Exam:  Blood pressure (!) 98/78, pulse 81, temperature 36.8 °C (98.2 °F), resp. rate 12, height 1.727 m (5' 8\"), weight 77.8 kg (171 lb 9.6 oz), SpO2 95 %.    Constitutional: Alert, no distress, plus 3 vital signs  Skin:  Warm, dry, no rashes invisible areas  Eye: Equal, round and reactive, conjunctiva clear  ENMT: Lips without lesions, good dentition, oropharynx clear    Neck: Trachea midlin  Respiratory: Unlabored respiration, lungs clear to auscultation, no wheezes, no rhonchi  Cardiovascular: Normal rate and rhythm, no murmur, no edema  Neuro: Cranial nerves II through XII are intact. Her gait is without any sort of abnormality. Her memory is intact.  Psych: Alert, pleasant, well-groomed, normal affect    A/P:  1. Heat syncope, subsequent encounter  -Reviewed MRI and CT with her. She signed a release to obtain her medical records from [facility]. Referral placed for her to follow-up with neurology which we discussed maybe 2-3 months from now. Discussed that she should not drive when she is tired and should refrain from driving after dark for the next 6 months.  - REFERRAL TO NEUROLOGY    2. Seizure (CMS-HCC)  -No history of the same. She'll follow up with neurology when they can see her. She'll follow-up here as well in 3 months with Dr. Rodríguez. Driving instructions as above. She was given strict return to ER precautions.  - REFERRAL TO NEUROLOGY    3. Mixed hyperlipidemia  -Improved in terms of her LDL. She is working on reducing her intake of alcohol which we discussed will decrease her triglyceride level. She is willing to follow-up here in 3 months with Dr. Rodríguez.    "

## 2017-09-27 LAB — EKG IMPRESSION: NORMAL

## 2017-10-18 RX ORDER — ALPRAZOLAM 1 MG/1
TABLET ORAL
Qty: 30 TAB | Refills: 0 | Status: CANCELLED | OUTPATIENT
Start: 2017-10-18

## 2017-10-18 RX ORDER — ALPRAZOLAM 1 MG/1
TABLET ORAL
Qty: 30 TAB | Refills: 5 | Status: SHIPPED
Start: 2017-10-18 | End: 2018-01-18

## 2017-10-18 NOTE — TELEPHONE ENCOUNTER
Prescription faxed to:    [id number] - [place], [place] - [address]  [address]  [place] [address]  Phone: [phone] Fax: [phone]  .

## 2017-10-18 NOTE — TELEPHONE ENCOUNTER
Was the patient seen in the last year in this department? Yes     Does patient have an active prescription for medications requested? No     Received Request Via: Patient     Last visit:9/18/17    Last  fill:9/2/17

## 2017-11-14 DIAGNOSIS — Z78.0 POSTMENOPAUSAL ESTROGEN DEFICIENCY: ICD-10-CM

## 2017-11-14 DIAGNOSIS — E78.2 MIXED HYPERLIPIDEMIA: ICD-10-CM

## 2017-11-14 DIAGNOSIS — R73.01 IMPAIRED FASTING GLUCOSE: ICD-10-CM

## 2017-11-30 RX ORDER — EZETIMIBE 10 MG/1
10 TABLET ORAL DAILY
Qty: 90 TAB | Refills: 3 | Status: SHIPPED | OUTPATIENT
Start: 2017-11-30 | End: 2018-03-13 | Stop reason: SDUPTHER

## 2017-12-11 ENCOUNTER — HOSPITAL ENCOUNTER (OUTPATIENT)
Dept: RADIOLOGY | Facility: MEDICAL CENTER | Age: 57
End: 2017-12-11
Attending: FAMILY MEDICINE
Payer: COMMERCIAL

## 2017-12-11 DIAGNOSIS — Z12.31 ENCOUNTER FOR SCREENING MAMMOGRAM FOR MALIGNANT NEOPLASM OF BREAST: ICD-10-CM

## 2017-12-11 DIAGNOSIS — Z78.0 POSTMENOPAUSAL ESTROGEN DEFICIENCY: ICD-10-CM

## 2017-12-11 PROCEDURE — 77080 DXA BONE DENSITY AXIAL: CPT

## 2017-12-11 PROCEDURE — G0202 SCR MAMMO BI INCL CAD: HCPCS

## 2017-12-19 ENCOUNTER — APPOINTMENT (OUTPATIENT)
Dept: MEDICAL GROUP | Facility: LAB | Age: 57
End: 2017-12-19
Payer: COMMERCIAL

## 2018-01-16 ENCOUNTER — HOSPITAL ENCOUNTER (OUTPATIENT)
Dept: LAB | Facility: MEDICAL CENTER | Age: 58
End: 2018-01-16
Attending: FAMILY MEDICINE
Payer: COMMERCIAL

## 2018-01-16 DIAGNOSIS — E78.2 MIXED HYPERLIPIDEMIA: ICD-10-CM

## 2018-01-16 DIAGNOSIS — R73.01 IMPAIRED FASTING GLUCOSE: ICD-10-CM

## 2018-01-16 LAB
ALBUMIN SERPL BCP-MCNC: 4.2 G/DL (ref 3.2–4.9)
ALBUMIN/GLOB SERPL: 1.7 G/DL
ALP SERPL-CCNC: 55 U/L (ref 30–99)
ALT SERPL-CCNC: 122 U/L (ref 2–50)
ANION GAP SERPL CALC-SCNC: 9 MMOL/L (ref 0–11.9)
AST SERPL-CCNC: 70 U/L (ref 12–45)
BILIRUB SERPL-MCNC: 0.5 MG/DL (ref 0.1–1.5)
BUN SERPL-MCNC: 12 MG/DL (ref 8–22)
CALCIUM SERPL-MCNC: 9.3 MG/DL (ref 8.5–10.5)
CHLORIDE SERPL-SCNC: 104 MMOL/L (ref 96–112)
CHOLEST SERPL-MCNC: 269 MG/DL (ref 100–199)
CO2 SERPL-SCNC: 28 MMOL/L (ref 20–33)
CREAT SERPL-MCNC: 0.73 MG/DL (ref 0.5–1.4)
EST. AVERAGE GLUCOSE BLD GHB EST-MCNC: 123 MG/DL
GLOBULIN SER CALC-MCNC: 2.5 G/DL (ref 1.9–3.5)
GLUCOSE SERPL-MCNC: 120 MG/DL (ref 65–99)
HBA1C MFR BLD: 5.9 % (ref 0–5.6)
HDLC SERPL-MCNC: 57 MG/DL
LDLC SERPL CALC-MCNC: 155 MG/DL
POTASSIUM SERPL-SCNC: 4.5 MMOL/L (ref 3.6–5.5)
PROT SERPL-MCNC: 6.7 G/DL (ref 6–8.2)
SODIUM SERPL-SCNC: 141 MMOL/L (ref 135–145)
TRIGL SERPL-MCNC: 284 MG/DL (ref 0–149)

## 2018-01-16 PROCEDURE — 36415 COLL VENOUS BLD VENIPUNCTURE: CPT

## 2018-01-16 PROCEDURE — 80053 COMPREHEN METABOLIC PANEL: CPT

## 2018-01-16 PROCEDURE — 83036 HEMOGLOBIN GLYCOSYLATED A1C: CPT

## 2018-01-16 PROCEDURE — 80061 LIPID PANEL: CPT

## 2018-01-18 ENCOUNTER — OFFICE VISIT (OUTPATIENT)
Dept: MEDICAL GROUP | Facility: LAB | Age: 58
End: 2018-01-18
Payer: COMMERCIAL

## 2018-01-18 VITALS
TEMPERATURE: 97.5 F | SYSTOLIC BLOOD PRESSURE: 120 MMHG | RESPIRATION RATE: 12 BRPM | DIASTOLIC BLOOD PRESSURE: 84 MMHG | WEIGHT: 174.16 LBS | HEIGHT: 68 IN | OXYGEN SATURATION: 95 % | HEART RATE: 75 BPM | BODY MASS INDEX: 26.4 KG/M2

## 2018-01-18 DIAGNOSIS — E78.2 MIXED HYPERLIPIDEMIA: ICD-10-CM

## 2018-01-18 DIAGNOSIS — R73.01 IMPAIRED FASTING GLUCOSE: ICD-10-CM

## 2018-01-18 DIAGNOSIS — F10.90 ALCOHOL USE DISORDER: ICD-10-CM

## 2018-01-18 DIAGNOSIS — R73.03 PREDIABETES: ICD-10-CM

## 2018-01-18 DIAGNOSIS — R74.8 ELEVATED LIVER ENZYMES: ICD-10-CM

## 2018-01-18 PROCEDURE — 99214 OFFICE O/P EST MOD 30 MIN: CPT | Performed by: FAMILY MEDICINE

## 2018-01-18 NOTE — PROGRESS NOTES
Subjective:   Garret Crespo is a 57 y.o. female here today for   Chief Complaint   Patient presents with   • Seizure     follow up   • Syncope     follow up   • Elevated Glucose     review recent labs       1. Impaired fasting glucose  2. Prediabetes  This is a chronic problem. Patient reports increase in alcohol consumption. She is exercising very regularly and walking up to 8 miles per day. She states that overall her diet is healthy although she does indulge in carbohydrates at times. No polyuria or polydipsia.  Lab Results   Component Value Date/Time    SODIUM 141 01/16/2018 07:43 AM    POTASSIUM 4.5 01/16/2018 07:43 AM    CHLORIDE 104 01/16/2018 07:43 AM    CO2 28 01/16/2018 07:43 AM    GLUCOSE 120 (H) 01/16/2018 07:43 AM    BUN 12 01/16/2018 07:43 AM    CREATININE 0.73 01/16/2018 07:43 AM      Results for GARRET CRESPO (MRN [id number]) as of 1/18/2018 12:14   Ref. Range 1/16/2018 07:43   Glycohemoglobin Latest Ref Range: 0.0 - 5.6 % 5.9 (H)   Estim. Avg Glu Latest Units: mg/dL 123     3. Elevated liver enzymes  This is chronic. Patient has had waxing and waning LFTs depending on her alcohol consumption. Her last labs were done a couple of days ago which show worsening of her AST and ALT. Bilirubin is normal. She denies any increased abdominal circumference, jaundice, lower extremity edema, nausea, vomiting, hematemesis, melena. She is currently drinking about 6 shots of liquor a night. She has not gone through withdrawal symptoms but she is drinking every night.  Results for GARRET CRESPO (MRN [id number]) as of 1/18/2018 12:14   Ref. Range 1/16/2018 07:43   AST(SGOT) Latest Ref Range: 12 - 45 U/L 70 (H)   ALT(SGPT) Latest Ref Range: 2 - 50 U/L 122 (H)   Alkaline Phosphatase Latest Ref Range: 30 - 99 U/L 55   Total Bilirubin Latest Ref Range: 0.1 - 1.5 mg/dL 0.5   Albumin Latest Ref Range: 3.2 - 4.9 g/dL 4.2   Total Protein Latest Ref Range: 6.0 - 8.2 g/dL 6.7   Globulin Latest Ref Range: 1.9 -  "3.5 g/dL 2.5   A-G Ratio Latest Units: g/dL 1.7     4. Alcohol use disorder (CMS-HCC)  This is chronic problem.  She is currently drinking about 6 shots of liquor a night. She has not gone through withdrawal symptoms but she is drinking every night. She states that she started drinking more over the last several months. She is feeling unhappy at home. She has never been in AA. She has never sought treatment. I did prescribe naltrexone in the past but she never filled this prescription.    5. Mixed hyperlipidemia  This is chronic. Patient has had cholesterol that severely worsens with alcohol consumption but then improves when she is doing better. Her labs are back up this time. She is taking Zetia 10 mg daily. Still smoking minimally  Results for GARRET OSBORNE (MRN [id number]) as of 1/18/2018 12:14   Ref. Range 9/14/2017 13:35 1/16/2018 07:43   Cholesterol,Tot Latest Ref Range: 100 - 199 mg/dL 220 (H) 269 (H)   Triglycerides Latest Ref Range: 0 - 149 mg/dL 262 (H) 284 (H)   HDL Latest Ref Range: >=40 mg/dL 49 57   LDL Latest Ref Range: <100 mg/dL 119 (H) 155 (H)       Current medicines (including changes today)  Current Outpatient Prescriptions   Medication Sig Dispense Refill   • ezetimibe (ZETIA) 10 MG Tab Take 1 Tab by mouth every day. 90 Tab 3     No current facility-administered medications for this visit.      She  has a past medical history of Dyspnea (3/22/2012); GERD (gastroesophageal reflux disease) (3/22/2012); Hyperlipidemia (8/3/2011); Insomnia (12/5/2014); Mixed hyperlipidemia (7/27/2015); and Solar keratosis (8/3/2011). She also has no past medical history of Breast cancer (CMS-HCC).    ROS   No fevers  No bowel changes  No LE edema       Objective:     Blood pressure 120/84, pulse 75, temperature 36.4 °C (97.5 °F), resp. rate 12, height 1.727 m (5' 8\"), weight 79 kg (174 lb 2.6 oz), SpO2 95 %. Body mass index is 26.48 kg/m².   Physical Exam:  Constitutional: Alert, no distress.  Skin: Warm, dry, " good turgor, no rashes in visible areas.  Eye: Equal, round and reactive, conjunctiva clear, lids normal.  ENMT: Lips without lesions, good dentition, oropharynx clear.  Neck: Trachea midline, no masses, no thyromegaly. No cervical or supraclavicular lymphadenopathy  Respiratory: Unlabored respiratory effort, lungs clear to auscultation, no wheezes, no ronchi.  Cardiovascular: Normal S1, S2, RRR, no murmur, no edema.  Abdomen: Soft, non-tender, no masses, no hepatosplenomegaly.  Psych: Alert and oriented x3, normal affect and mood.      Assessment and Plan:   The following treatment plan was discussed    1. Impaired fasting glucose  2. Prediabetes  This is chronic and    uncontrolled. We did discuss the indications of diabetes. Patient is went to work on lifestyle modifications and increased alcohol use  - COMP METABOLIC PANEL; Future  - HEMOGLOBIN A1C; Future    3. Elevated liver enzymes  4. Alcohol use disorder (CMS-HCC)  This is chronic and uncontrolled. LFTs 2/2 ETOH. Previous hep panel neg.  Currently drinking 6 shots of liquor per night. Patient states that she is interested in AA or other treatment programs but declines assistance in getting into them. She declines any medication management such as naltrexone at this time but will contact us within one month if she is unable to do this on her own. I did recommend a sooner follow-up, but the patient wishes to try to work on this on her own and she will follow-up. As needed for this. We had given her support today and discussed ER precautions and symptoms of withdrawal. I would like for her to recheck her labs in 6 months  - COMP METABOLIC PANEL; Future  - CBC WITH DIFFERENTIAL; Future  - PROTHROMBIN TIME; Future    5. Mixed hyperlipidemia  Chronic, uncontrolled. We will continue Zetia 10 mg daily for now and that the importance of tobacco cessation and alcohol cessation was discussed. Today  - LIPID PROFILE; Future      Followup: Return in about 6 months  (around 7/18/2018) for Annual, labs, liver, ETOH.       This note was created using voice recognition software. I have made every reasonable attempt to correct errors, however, I do anticipate some grammatical errors.

## 2018-03-13 RX ORDER — EZETIMIBE 10 MG/1
10 TABLET ORAL DAILY
Qty: 90 TAB | Refills: 3 | Status: SHIPPED | OUTPATIENT
Start: 2018-03-13 | End: 2018-06-12 | Stop reason: SDUPTHER

## 2018-04-30 ENCOUNTER — PATIENT MESSAGE (OUTPATIENT)
Dept: MEDICAL GROUP | Facility: LAB | Age: 58
End: 2018-04-30

## 2018-04-30 DIAGNOSIS — F51.01 PRIMARY INSOMNIA: ICD-10-CM

## 2018-04-30 RX ORDER — ALPRAZOLAM 1 MG/1
TABLET ORAL
Refills: 5
Start: 2018-04-30

## 2018-04-30 RX ORDER — ALPRAZOLAM 1 MG/1
1 TABLET ORAL NIGHTLY PRN
Qty: 30 TAB | Refills: 2 | Status: SHIPPED
Start: 2018-04-30 | End: 2018-11-07 | Stop reason: SDUPTHER

## 2018-04-30 NOTE — TELEPHONE ENCOUNTER
Was the patient seen in the last year in this department? Yes  2/23/18  #30  Does patient have an active prescription for medications requested? No     Received Request Via: Pharmacy

## 2018-05-04 DIAGNOSIS — F32.1 MODERATE SINGLE CURRENT EPISODE OF MAJOR DEPRESSIVE DISORDER (HCC): ICD-10-CM

## 2018-05-07 RX ORDER — FLUTICASONE PROPIONATE 50 MCG
SPRAY, SUSPENSION (ML) NASAL
Qty: 48 G | Refills: 3 | Status: SHIPPED | OUTPATIENT
Start: 2018-05-07 | End: 2018-11-11

## 2018-06-12 RX ORDER — EZETIMIBE 10 MG/1
10 TABLET ORAL DAILY
Qty: 90 TAB | Refills: 3 | Status: SHIPPED | OUTPATIENT
Start: 2018-06-12 | End: 2018-11-11

## 2018-06-12 NOTE — TELEPHONE ENCOUNTER
Was the patient seen in the last year in this department? Yes     Does patient have an active prescription for medications requested? Yes  Patient is changing pharmacy.    Received Request Via: Patient

## 2018-11-07 DIAGNOSIS — F51.01 PRIMARY INSOMNIA: ICD-10-CM

## 2018-11-07 RX ORDER — ALPRAZOLAM 1 MG/1
TABLET ORAL
Qty: 30 TAB | Refills: 2
Start: 2018-11-07 | End: 2018-12-07

## 2018-11-07 RX ORDER — ALPRAZOLAM 1 MG/1
1 TABLET ORAL NIGHTLY PRN
Qty: 30 TAB | Refills: 0 | Status: SHIPPED
Start: 2018-11-07 | End: 2018-11-08 | Stop reason: SDUPTHER

## 2018-11-07 NOTE — TELEPHONE ENCOUNTER
Was the patient seen in the last year in this department? Yes 1/18/2018    Does patient have an active prescription for medications requested? No     Received Request Via: Pharmacy

## 2018-11-08 DIAGNOSIS — F51.01 PRIMARY INSOMNIA: ICD-10-CM

## 2018-11-08 RX ORDER — ALPRAZOLAM 1 MG/1
TABLET ORAL
Qty: 30 TAB | Refills: 2 | Status: SHIPPED
Start: 2018-11-08 | End: 2018-12-08

## 2018-11-08 NOTE — TELEPHONE ENCOUNTER
Was the patient seen in the last year in this department? Yes [facility] 9/12/18 #30    Does patient have an active prescription for medications requested? No     Received Request Via: Pharmacy

## 2018-11-09 LAB
ALBUMIN SERPL-MCNC: 4.6 G/DL (ref 3.5–5.5)
ALBUMIN/GLOB SERPL: 2 {RATIO} (ref 1.2–2.2)
ALP SERPL-CCNC: 64 IU/L (ref 39–117)
ALT SERPL-CCNC: 165 IU/L (ref 0–32)
AST SERPL-CCNC: 131 IU/L (ref 0–40)
BASOPHILS # BLD AUTO: 0 X10E3/UL (ref 0–0.2)
BASOPHILS NFR BLD AUTO: 1 %
BILIRUB SERPL-MCNC: 0.8 MG/DL (ref 0–1.2)
BUN SERPL-MCNC: 13 MG/DL (ref 6–24)
BUN/CREAT SERPL: 15 (ref 9–23)
CALCIUM SERPL-MCNC: 9.8 MG/DL (ref 8.7–10.2)
CHLORIDE SERPL-SCNC: 100 MMOL/L (ref 96–106)
CHOLEST SERPL-MCNC: 312 MG/DL (ref 100–199)
CO2 SERPL-SCNC: 23 MMOL/L (ref 20–29)
CREAT SERPL-MCNC: 0.85 MG/DL (ref 0.57–1)
EOSINOPHIL # BLD AUTO: 0.1 X10E3/UL (ref 0–0.4)
EOSINOPHIL NFR BLD AUTO: 2 %
ERYTHROCYTE [DISTWIDTH] IN BLOOD BY AUTOMATED COUNT: 11.9 % (ref 12.3–15.4)
GLOBULIN SER CALC-MCNC: 2.3 G/DL (ref 1.5–4.5)
GLUCOSE SERPL-MCNC: 83 MG/DL (ref 65–99)
HBA1C MFR BLD: 5.6 % (ref 4.8–5.6)
HCT VFR BLD AUTO: 46.1 % (ref 34–46.6)
HDLC SERPL-MCNC: 59 MG/DL
HGB BLD-MCNC: 15.7 G/DL (ref 11.1–15.9)
IF AFRICAN AMERICAN  100797: 89 ML/MIN/1.73
IF NON AFRICAN AMER 100791: 77 ML/MIN/1.73
IMM GRANULOCYTES # BLD: 0 X10E3/UL (ref 0–0.1)
IMM GRANULOCYTES NFR BLD: 0 %
IMMATURE CELLS  115398: ABNORMAL
INR PPP: 1 (ref 0.8–1.2)
LABORATORY COMMENT REPORT: ABNORMAL
LDLC SERPL CALC-MCNC: 216 MG/DL (ref 0–99)
LYMPHOCYTES # BLD AUTO: 2.8 X10E3/UL (ref 0.7–3.1)
LYMPHOCYTES NFR BLD AUTO: 44 %
MCH RBC QN AUTO: 32.7 PG (ref 26.6–33)
MCHC RBC AUTO-ENTMCNC: 34.1 G/DL (ref 31.5–35.7)
MCV RBC AUTO: 96 FL (ref 79–97)
MONOCYTES # BLD AUTO: 0.5 X10E3/UL (ref 0.1–0.9)
MONOCYTES NFR BLD AUTO: 9 %
MORPHOLOGY BLD-IMP: ABNORMAL
NEUTROPHILS # BLD AUTO: 2.7 X10E3/UL (ref 1.4–7)
NEUTROPHILS NFR BLD AUTO: 44 %
NRBC BLD AUTO-RTO: ABNORMAL %
PLATELET # BLD AUTO: 240 X10E3/UL (ref 150–379)
POTASSIUM SERPL-SCNC: 3.9 MMOL/L (ref 3.5–5.2)
PROT SERPL-MCNC: 6.9 G/DL (ref 6–8.5)
PROTHROMBIN TIME: 11.3 SEC (ref 9.1–12)
RBC # BLD AUTO: 4.8 X10E6/UL (ref 3.77–5.28)
SODIUM SERPL-SCNC: 139 MMOL/L (ref 134–144)
TRIGL SERPL-MCNC: 186 MG/DL (ref 0–149)
VLDLC SERPL CALC-MCNC: 37 MG/DL (ref 5–40)
WBC # BLD AUTO: 6.2 X10E3/UL (ref 3.4–10.8)

## 2018-11-11 ENCOUNTER — APPOINTMENT (OUTPATIENT)
Dept: RADIOLOGY | Facility: MEDICAL CENTER | Age: 58
DRG: 200 | End: 2018-11-11
Attending: EMERGENCY MEDICINE
Payer: COMMERCIAL

## 2018-11-11 ENCOUNTER — HOSPITAL ENCOUNTER (INPATIENT)
Facility: MEDICAL CENTER | Age: 58
LOS: 2 days | DRG: 200 | End: 2018-11-13
Attending: EMERGENCY MEDICINE | Admitting: SURGERY
Payer: COMMERCIAL

## 2018-11-11 ENCOUNTER — APPOINTMENT (OUTPATIENT)
Dept: RADIOLOGY | Facility: MEDICAL CENTER | Age: 58
DRG: 200 | End: 2018-11-11
Attending: NURSE PRACTITIONER
Payer: COMMERCIAL

## 2018-11-11 ENCOUNTER — APPOINTMENT (OUTPATIENT)
Dept: RADIOLOGY | Facility: MEDICAL CENTER | Age: 58
End: 2018-11-11
Attending: EMERGENCY MEDICINE
Payer: COMMERCIAL

## 2018-11-11 ENCOUNTER — HOSPITAL ENCOUNTER (EMERGENCY)
Facility: MEDICAL CENTER | Age: 58
End: 2018-11-11
Attending: EMERGENCY MEDICINE
Payer: COMMERCIAL

## 2018-11-11 VITALS
WEIGHT: 170.75 LBS | BODY MASS INDEX: 25.88 KG/M2 | TEMPERATURE: 97.6 F | HEART RATE: 79 BPM | HEIGHT: 68 IN | RESPIRATION RATE: 17 BRPM | OXYGEN SATURATION: 100 %
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
## 2018-11-11 DIAGNOSIS — T14.90XA TRAUMA: ICD-10-CM

## 2018-11-11 DIAGNOSIS — S22.41XA CLOSED FRACTURE OF MULTIPLE RIBS OF RIGHT SIDE, INITIAL ENCOUNTER: Primary | ICD-10-CM

## 2018-11-11 DIAGNOSIS — M25.551 RIGHT HIP PAIN: ICD-10-CM

## 2018-11-11 DIAGNOSIS — S27.0XXA TRAUMATIC PNEUMOTHORAX, INITIAL ENCOUNTER: ICD-10-CM

## 2018-11-11 DIAGNOSIS — S20.211A CONTUSION OF RIGHT CHEST WALL, INITIAL ENCOUNTER: ICD-10-CM

## 2018-11-11 DIAGNOSIS — J93.9 PNEUMOTHORAX ON RIGHT: ICD-10-CM

## 2018-11-11 DIAGNOSIS — R10.31 RIGHT GROIN PAIN: ICD-10-CM

## 2018-11-11 DIAGNOSIS — S22.41XA CLOSED FRACTURE OF FOUR RIBS OF RIGHT SIDE, INITIAL ENCOUNTER: ICD-10-CM

## 2018-11-11 PROBLEM — M89.9 BONE LESION: Status: ACTIVE | Noted: 2018-11-11

## 2018-11-11 PROBLEM — R10.2 ACUTE PELVIC PAIN: Status: ACTIVE | Noted: 2018-11-11

## 2018-11-11 PROBLEM — R91.1 PULMONARY NODULE: Status: ACTIVE | Noted: 2018-11-11

## 2018-11-11 LAB
ABO GROUP BLD: NORMAL
ABO GROUP BLD: NORMAL
ALBUMIN SERPL BCP-MCNC: 3.9 G/DL (ref 3.2–4.9)
ALBUMIN SERPL BCP-MCNC: 3.9 G/DL (ref 3.2–4.9)
ALBUMIN/GLOB SERPL: 1.4 G/DL
ALBUMIN/GLOB SERPL: 1.6 G/DL
ALP SERPL-CCNC: 54 U/L (ref 30–99)
ALP SERPL-CCNC: 62 U/L (ref 30–99)
ALT SERPL-CCNC: 104 U/L (ref 2–50)
ALT SERPL-CCNC: 85 U/L (ref 2–50)
ANION GAP SERPL CALC-SCNC: 12 MMOL/L (ref 0–11.9)
ANION GAP SERPL CALC-SCNC: 8 MMOL/L (ref 0–11.9)
APTT PPP: 24.4 SEC (ref 24.7–36)
AST SERPL-CCNC: 47 U/L (ref 12–45)
AST SERPL-CCNC: 67 U/L (ref 12–45)
BASOPHILS # BLD AUTO: 0.3 % (ref 0–1.8)
BASOPHILS # BLD: 0.04 K/UL (ref 0–0.12)
BILIRUB SERPL-MCNC: 0.8 MG/DL (ref 0.1–1.5)
BILIRUB SERPL-MCNC: 1.2 MG/DL (ref 0.1–1.5)
BLD GP AB SCN SERPL QL: NORMAL
BUN SERPL-MCNC: 11 MG/DL (ref 8–22)
BUN SERPL-MCNC: 11 MG/DL (ref 8–22)
CALCIUM SERPL-MCNC: 8.7 MG/DL (ref 8.5–10.5)
CALCIUM SERPL-MCNC: 8.9 MG/DL (ref 8.4–10.2)
CHLORIDE SERPL-SCNC: 104 MMOL/L (ref 96–112)
CHLORIDE SERPL-SCNC: 109 MMOL/L (ref 96–112)
CO2 SERPL-SCNC: 21 MMOL/L (ref 20–33)
CO2 SERPL-SCNC: 23 MMOL/L (ref 20–33)
CREAT SERPL-MCNC: 0.75 MG/DL (ref 0.5–1.4)
CREAT SERPL-MCNC: 0.94 MG/DL (ref 0.5–1.4)
EOSINOPHIL # BLD AUTO: 0.02 K/UL (ref 0–0.51)
EOSINOPHIL NFR BLD: 0.2 % (ref 0–6.9)
ERYTHROCYTE [DISTWIDTH] IN BLOOD BY AUTOMATED COUNT: 37.6 FL (ref 35.9–50)
ERYTHROCYTE [DISTWIDTH] IN BLOOD BY AUTOMATED COUNT: 39.3 FL (ref 35.9–50)
ETHANOL BLD-MCNC: 0 G/DL
GLOBULIN SER CALC-MCNC: 2.5 G/DL (ref 1.9–3.5)
GLOBULIN SER CALC-MCNC: 2.8 G/DL (ref 1.9–3.5)
GLUCOSE SERPL-MCNC: 118 MG/DL (ref 65–99)
GLUCOSE SERPL-MCNC: 156 MG/DL (ref 65–99)
HCG SERPL QL: NEGATIVE
HCT VFR BLD AUTO: 41 % (ref 37–47)
HCT VFR BLD AUTO: 41.4 % (ref 37–47)
HGB BLD-MCNC: 13.9 G/DL (ref 12–16)
HGB BLD-MCNC: 14.5 G/DL (ref 12–16)
IMM GRANULOCYTES # BLD AUTO: 0.08 K/UL (ref 0–0.11)
IMM GRANULOCYTES NFR BLD AUTO: 0.7 % (ref 0–0.9)
INR PPP: 1.07 (ref 0.87–1.13)
LYMPHOCYTES # BLD AUTO: 1.77 K/UL (ref 1–4.8)
LYMPHOCYTES NFR BLD: 14.8 % (ref 22–41)
MCH RBC QN AUTO: 32.2 PG (ref 27–33)
MCH RBC QN AUTO: 32.6 PG (ref 27–33)
MCHC RBC AUTO-ENTMCNC: 33.9 G/DL (ref 33.6–35)
MCHC RBC AUTO-ENTMCNC: 35 G/DL (ref 33.6–35)
MCV RBC AUTO: 91.8 FL (ref 81.4–97.8)
MCV RBC AUTO: 96 FL (ref 81.4–97.8)
MONOCYTES # BLD AUTO: 1.05 K/UL (ref 0–0.85)
MONOCYTES NFR BLD AUTO: 8.8 % (ref 0–13.4)
NEUTROPHILS # BLD AUTO: 8.96 K/UL (ref 2–7.15)
NEUTROPHILS NFR BLD: 75.2 % (ref 44–72)
NRBC # BLD AUTO: 0 K/UL
NRBC BLD-RTO: 0 /100 WBC
PLATELET # BLD AUTO: 192 K/UL (ref 164–446)
PLATELET # BLD AUTO: 206 K/UL (ref 164–446)
PMV BLD AUTO: 10 FL (ref 9–12.9)
PMV BLD AUTO: 9.9 FL (ref 9–12.9)
POTASSIUM SERPL-SCNC: 3.8 MMOL/L (ref 3.6–5.5)
POTASSIUM SERPL-SCNC: 3.9 MMOL/L (ref 3.6–5.5)
PROT SERPL-MCNC: 6.4 G/DL (ref 6–8.2)
PROT SERPL-MCNC: 6.7 G/DL (ref 6–8.2)
PROTHROMBIN TIME: 14.1 SEC (ref 12–14.6)
RBC # BLD AUTO: 4.27 M/UL (ref 4.2–5.4)
RBC # BLD AUTO: 4.51 M/UL (ref 4.2–5.4)
RH BLD: NORMAL
RH BLD: NORMAL
SODIUM SERPL-SCNC: 137 MMOL/L (ref 135–145)
SODIUM SERPL-SCNC: 140 MMOL/L (ref 135–145)
WBC # BLD AUTO: 11.9 K/UL (ref 4.8–10.8)
WBC # BLD AUTO: 11.9 K/UL (ref 4.8–10.8)

## 2018-11-11 PROCEDURE — 85610 PROTHROMBIN TIME: CPT

## 2018-11-11 PROCEDURE — 73552 X-RAY EXAM OF FEMUR 2/>: CPT | Mod: RT

## 2018-11-11 PROCEDURE — 96375 TX/PRO/DX INJ NEW DRUG ADDON: CPT

## 2018-11-11 PROCEDURE — 85027 COMPLETE CBC AUTOMATED: CPT

## 2018-11-11 PROCEDURE — A9270 NON-COVERED ITEM OR SERVICE: HCPCS | Performed by: NURSE PRACTITIONER

## 2018-11-11 PROCEDURE — 36415 COLL VENOUS BLD VENIPUNCTURE: CPT

## 2018-11-11 PROCEDURE — 99284 EMERGENCY DEPT VISIT MOD MDM: CPT

## 2018-11-11 PROCEDURE — 94760 N-INVAS EAR/PLS OXIMETRY 1: CPT

## 2018-11-11 PROCEDURE — 700105 HCHG RX REV CODE 258: Performed by: NURSE PRACTITIONER

## 2018-11-11 PROCEDURE — 96374 THER/PROPH/DIAG INJ IV PUSH: CPT

## 2018-11-11 PROCEDURE — 80307 DRUG TEST PRSMV CHEM ANLYZR: CPT

## 2018-11-11 PROCEDURE — 85025 COMPLETE CBC W/AUTO DIFF WBC: CPT

## 2018-11-11 PROCEDURE — 86900 BLOOD TYPING SEROLOGIC ABO: CPT

## 2018-11-11 PROCEDURE — 700102 HCHG RX REV CODE 250 W/ 637 OVERRIDE(OP): Performed by: NURSE PRACTITIONER

## 2018-11-11 PROCEDURE — 700111 HCHG RX REV CODE 636 W/ 250 OVERRIDE (IP): Performed by: EMERGENCY MEDICINE

## 2018-11-11 PROCEDURE — 80053 COMPREHEN METABOLIC PANEL: CPT

## 2018-11-11 PROCEDURE — 700105 HCHG RX REV CODE 258: Performed by: EMERGENCY MEDICINE

## 2018-11-11 PROCEDURE — 71045 X-RAY EXAM CHEST 1 VIEW: CPT

## 2018-11-11 PROCEDURE — 72192 CT PELVIS W/O DYE: CPT

## 2018-11-11 PROCEDURE — 700112 HCHG RX REV CODE 229: Performed by: NURSE PRACTITIONER

## 2018-11-11 PROCEDURE — 99285 EMERGENCY DEPT VISIT HI MDM: CPT

## 2018-11-11 PROCEDURE — 86901 BLOOD TYPING SEROLOGIC RH(D): CPT

## 2018-11-11 PROCEDURE — 85730 THROMBOPLASTIN TIME PARTIAL: CPT

## 2018-11-11 PROCEDURE — 84703 CHORIONIC GONADOTROPIN ASSAY: CPT

## 2018-11-11 PROCEDURE — 305948 HCHG GREEN TRAUMA ACT PRE-NOTIFY NO CC

## 2018-11-11 PROCEDURE — 73522 X-RAY EXAM HIPS BI 3-4 VIEWS: CPT

## 2018-11-11 PROCEDURE — 71250 CT THORAX DX C-: CPT

## 2018-11-11 PROCEDURE — 80053 COMPREHEN METABOLIC PANEL: CPT | Mod: 91

## 2018-11-11 PROCEDURE — 86850 RBC ANTIBODY SCREEN: CPT

## 2018-11-11 PROCEDURE — 770006 HCHG ROOM/CARE - MED/SURG/GYN SEMI*

## 2018-11-11 PROCEDURE — 700101 HCHG RX REV CODE 250: Performed by: NURSE PRACTITIONER

## 2018-11-11 RX ORDER — LIDOCAINE 50 MG/G
1 PATCH TOPICAL EVERY 24 HOURS
Status: DISCONTINUED | OUTPATIENT
Start: 2018-11-11 | End: 2018-11-13 | Stop reason: HOSPADM

## 2018-11-11 RX ORDER — GABAPENTIN 100 MG/1
100 CAPSULE ORAL 3 TIMES DAILY
Status: DISCONTINUED | OUTPATIENT
Start: 2018-11-11 | End: 2018-11-13 | Stop reason: HOSPADM

## 2018-11-11 RX ORDER — SODIUM CHLORIDE, SODIUM LACTATE, POTASSIUM CHLORIDE, CALCIUM CHLORIDE 600; 310; 30; 20 MG/100ML; MG/100ML; MG/100ML; MG/100ML
INJECTION, SOLUTION INTRAVENOUS CONTINUOUS
Status: DISCONTINUED | OUTPATIENT
Start: 2018-11-11 | End: 2018-11-13 | Stop reason: HOSPADM

## 2018-11-11 RX ORDER — DIPHENHYDRAMINE HCL 25 MG
25 TABLET ORAL EVERY 6 HOURS PRN
Status: DISCONTINUED | OUTPATIENT
Start: 2018-11-11 | End: 2018-11-13 | Stop reason: HOSPADM

## 2018-11-11 RX ORDER — DOCUSATE SODIUM 100 MG/1
100 CAPSULE, LIQUID FILLED ORAL 2 TIMES DAILY
Status: DISCONTINUED | OUTPATIENT
Start: 2018-11-11 | End: 2018-11-13 | Stop reason: HOSPADM

## 2018-11-11 RX ORDER — ACETAMINOPHEN 500 MG
1000 TABLET ORAL EVERY 6 HOURS
Status: DISCONTINUED | OUTPATIENT
Start: 2018-11-11 | End: 2018-11-11

## 2018-11-11 RX ORDER — SODIUM CHLORIDE 9 MG/ML
1000 INJECTION, SOLUTION INTRAVENOUS ONCE
Status: COMPLETED | OUTPATIENT
Start: 2018-11-11 | End: 2018-11-11

## 2018-11-11 RX ORDER — MORPHINE SULFATE 4 MG/ML
4 INJECTION, SOLUTION INTRAMUSCULAR; INTRAVENOUS ONCE
Status: COMPLETED | OUTPATIENT
Start: 2018-11-11 | End: 2018-11-11

## 2018-11-11 RX ORDER — ACETAMINOPHEN 650 MG/1
650 SUPPOSITORY RECTAL EVERY 4 HOURS PRN
Status: DISCONTINUED | OUTPATIENT
Start: 2018-11-11 | End: 2018-11-13 | Stop reason: HOSPADM

## 2018-11-11 RX ORDER — LORAZEPAM 2 MG/ML
1 INJECTION INTRAMUSCULAR ONCE
Status: COMPLETED | OUTPATIENT
Start: 2018-11-11 | End: 2018-11-11

## 2018-11-11 RX ORDER — AMOXICILLIN 250 MG
1 CAPSULE ORAL NIGHTLY
Status: DISCONTINUED | OUTPATIENT
Start: 2018-11-11 | End: 2018-11-13 | Stop reason: HOSPADM

## 2018-11-11 RX ORDER — POLYETHYLENE GLYCOL 3350 17 G/17G
1 POWDER, FOR SOLUTION ORAL 2 TIMES DAILY
Status: DISCONTINUED | OUTPATIENT
Start: 2018-11-11 | End: 2018-11-13 | Stop reason: HOSPADM

## 2018-11-11 RX ORDER — OXYCODONE HYDROCHLORIDE 10 MG/1
10 TABLET ORAL EVERY 4 HOURS PRN
Status: DISCONTINUED | OUTPATIENT
Start: 2018-11-11 | End: 2018-11-13 | Stop reason: HOSPADM

## 2018-11-11 RX ORDER — ACETAMINOPHEN 325 MG/1
650 TABLET ORAL EVERY 4 HOURS PRN
Status: DISCONTINUED | OUTPATIENT
Start: 2018-11-11 | End: 2018-11-13 | Stop reason: HOSPADM

## 2018-11-11 RX ORDER — HYDROCODONE BITARTRATE AND ACETAMINOPHEN 5; 325 MG/1; MG/1
1-2 TABLET ORAL EVERY 6 HOURS PRN
Status: DISCONTINUED | OUTPATIENT
Start: 2018-11-11 | End: 2018-11-11

## 2018-11-11 RX ORDER — ONDANSETRON 2 MG/ML
4 INJECTION INTRAMUSCULAR; INTRAVENOUS EVERY 4 HOURS PRN
Status: DISCONTINUED | OUTPATIENT
Start: 2018-11-11 | End: 2018-11-13 | Stop reason: HOSPADM

## 2018-11-11 RX ORDER — CELECOXIB 200 MG/1
200 CAPSULE ORAL 2 TIMES DAILY WITH MEALS
Status: DISCONTINUED | OUTPATIENT
Start: 2018-11-11 | End: 2018-11-13 | Stop reason: HOSPADM

## 2018-11-11 RX ORDER — BISACODYL 10 MG
10 SUPPOSITORY, RECTAL RECTAL
Status: DISCONTINUED | OUTPATIENT
Start: 2018-11-11 | End: 2018-11-13 | Stop reason: HOSPADM

## 2018-11-11 RX ORDER — OXYCODONE HYDROCHLORIDE 5 MG/1
5 TABLET ORAL EVERY 4 HOURS PRN
Status: DISCONTINUED | OUTPATIENT
Start: 2018-11-11 | End: 2018-11-13 | Stop reason: HOSPADM

## 2018-11-11 RX ORDER — MORPHINE SULFATE 4 MG/ML
4 INJECTION, SOLUTION INTRAMUSCULAR; INTRAVENOUS
Status: DISCONTINUED | OUTPATIENT
Start: 2018-11-11 | End: 2018-11-13 | Stop reason: HOSPADM

## 2018-11-11 RX ORDER — ENEMA 19; 7 G/133ML; G/133ML
1 ENEMA RECTAL
Status: DISCONTINUED | OUTPATIENT
Start: 2018-11-11 | End: 2018-11-13 | Stop reason: HOSPADM

## 2018-11-11 RX ORDER — AMOXICILLIN 250 MG
1 CAPSULE ORAL
Status: DISCONTINUED | OUTPATIENT
Start: 2018-11-11 | End: 2018-11-13 | Stop reason: HOSPADM

## 2018-11-11 RX ORDER — MORPHINE SULFATE 4 MG/ML
4 INJECTION, SOLUTION INTRAMUSCULAR; INTRAVENOUS
Status: DISCONTINUED | OUTPATIENT
Start: 2018-11-11 | End: 2018-11-11 | Stop reason: HOSPADM

## 2018-11-11 RX ADMIN — GABAPENTIN 100 MG: 100 CAPSULE ORAL at 17:01

## 2018-11-11 RX ADMIN — GABAPENTIN 100 MG: 100 CAPSULE ORAL at 13:10

## 2018-11-11 RX ADMIN — OXYCODONE HYDROCHLORIDE 10 MG: 10 TABLET ORAL at 13:10

## 2018-11-11 RX ADMIN — OXYCODONE HYDROCHLORIDE 10 MG: 10 TABLET ORAL at 17:01

## 2018-11-11 RX ADMIN — LIDOCAINE 1 PATCH: 50 PATCH TOPICAL at 13:10

## 2018-11-11 RX ADMIN — CELECOXIB 200 MG: 200 CAPSULE ORAL at 17:01

## 2018-11-11 RX ADMIN — SODIUM CHLORIDE, POTASSIUM CHLORIDE, SODIUM LACTATE AND CALCIUM CHLORIDE: 600; 310; 30; 20 INJECTION, SOLUTION INTRAVENOUS at 10:02

## 2018-11-11 RX ADMIN — MORPHINE SULFATE 4 MG: 4 INJECTION INTRAVENOUS at 04:35

## 2018-11-11 RX ADMIN — MORPHINE SULFATE 4 MG: 4 INJECTION INTRAVENOUS at 04:22

## 2018-11-11 RX ADMIN — HYDROCODONE BITARTRATE AND ACETAMINOPHEN 2 TABLET: 5; 325 TABLET ORAL at 08:53

## 2018-11-11 RX ADMIN — MORPHINE SULFATE 4 MG: 4 INJECTION INTRAVENOUS at 07:39

## 2018-11-11 RX ADMIN — FENTANYL CITRATE 100 MCG: 50 INJECTION, SOLUTION INTRAMUSCULAR; INTRAVENOUS at 06:32

## 2018-11-11 RX ADMIN — LORAZEPAM 1 MG: 2 INJECTION INTRAMUSCULAR; INTRAVENOUS at 04:58

## 2018-11-11 RX ADMIN — SODIUM CHLORIDE 1000 ML: 9 INJECTION, SOLUTION INTRAVENOUS at 05:18

## 2018-11-11 RX ADMIN — STANDARDIZED SENNA CONCENTRATE AND DOCUSATE SODIUM 1 TABLET: 8.6; 5 TABLET, FILM COATED ORAL at 20:07

## 2018-11-11 RX ADMIN — DOCUSATE SODIUM 100 MG: 100 CAPSULE, LIQUID FILLED ORAL at 17:01

## 2018-11-11 RX ADMIN — CELECOXIB 200 MG: 200 CAPSULE ORAL at 10:01

## 2018-11-11 RX ADMIN — OXYCODONE HYDROCHLORIDE 10 MG: 10 TABLET ORAL at 21:48

## 2018-11-11 ASSESSMENT — PAIN SCALES - GENERAL
PAINLEVEL_OUTOF10: 10
PAINLEVEL_OUTOF10: 5
PAINLEVEL_OUTOF10: 7
PAINLEVEL_OUTOF10: 9
PAINLEVEL_OUTOF10: 7
PAINLEVEL_OUTOF10: 6
PAINLEVEL_OUTOF10: 5
PAINLEVEL_OUTOF10: 6
PAINLEVEL_OUTOF10: 8
PAINLEVEL_OUTOF10: 6
PAINLEVEL_OUTOF10: 6

## 2018-11-11 ASSESSMENT — COGNITIVE AND FUNCTIONAL STATUS - GENERAL
CLIMB 3 TO 5 STEPS WITH RAILING: A LITTLE
HELP NEEDED FOR BATHING: A LITTLE
DRESSING REGULAR LOWER BODY CLOTHING: A LITTLE
WALKING IN HOSPITAL ROOM: A LOT
TOILETING: A LITTLE
PERSONAL GROOMING: A LITTLE
MOVING FROM LYING ON BACK TO SITTING ON SIDE OF FLAT BED: A LOT
SUGGESTED CMS G CODE MODIFIER MOBILITY: CK
DAILY ACTIVITIY SCORE: 20
MOVING TO AND FROM BED TO CHAIR: A LITTLE
MOBILITY SCORE: 18
SUGGESTED CMS G CODE MODIFIER DAILY ACTIVITY: CJ

## 2018-11-11 ASSESSMENT — LIFESTYLE VARIABLES
HAVE YOU EVER FELT YOU SHOULD CUT DOWN ON YOUR DRINKING: NO
HOW MANY TIMES IN THE PAST YEAR HAVE YOU HAD 5 OR MORE DRINKS IN A DAY: 0
TOTAL SCORE: 0
ALCOHOL_USE: YES
AVERAGE NUMBER OF DAYS PER WEEK YOU HAVE A DRINK CONTAINING ALCOHOL: 2
EVER HAD A DRINK FIRST THING IN THE MORNING TO STEADY YOUR NERVES TO GET RID OF A HANGOVER: NO
EVER_SMOKED: YES
EVER FELT BAD OR GUILTY ABOUT YOUR DRINKING: NO
TOTAL SCORE: 0
ON A TYPICAL DAY WHEN YOU DRINK ALCOHOL HOW MANY DRINKS DO YOU HAVE: 2
CONSUMPTION TOTAL: NEGATIVE
HAVE PEOPLE ANNOYED YOU BY CRITICIZING YOUR DRINKING: NO
TOTAL SCORE: 0

## 2018-11-11 ASSESSMENT — PAIN DESCRIPTION - DESCRIPTORS: DESCRIPTORS: ACHING

## 2018-11-11 ASSESSMENT — COPD QUESTIONNAIRES
COPD SCREENING SCORE: 3
DURING THE PAST 4 WEEKS HOW MUCH DID YOU FEEL SHORT OF BREATH: NONE/LITTLE OF THE TIME
DO YOU EVER COUGH UP ANY MUCUS OR PHLEGM?: NO/ONLY WITH OCCASIONAL COLDS OR INFECTIONS
HAVE YOU SMOKED AT LEAST 100 CIGARETTES IN YOUR ENTIRE LIFE: YES

## 2018-11-11 ASSESSMENT — PATIENT HEALTH QUESTIONNAIRE - PHQ9
1. LITTLE INTEREST OR PLEASURE IN DOING THINGS: NOT AT ALL
SUM OF ALL RESPONSES TO PHQ9 QUESTIONS 1 AND 2: 0
2. FEELING DOWN, DEPRESSED, IRRITABLE, OR HOPELESS: NOT AT ALL

## 2018-11-11 NOTE — ASSESSMENT & PLAN NOTE
Sclerotic lesions in the left ischium and right iliac bone has a somewhat atypical appearance for bone islands. Consider bone scan to assess for the possibility of metastatic disease if clinically appropriate.  Follow up outpatient.

## 2018-11-11 NOTE — ED PROVIDER NOTES
ED Provider Note    CHIEF COMPLAINT  Chief Complaint   Patient presents with   • Trauma Green     GLF, - LOC       HPI  Beatriz Crespo is a 58 y.o. female who presents to the emergency department by ambulance from [facility] emergency department, transferred for higher level of care, trauma evaluation.  Patient presented to the outside facility with right-sided chest pain, right hip and groin pain after mechanical trip and fall against a jet ski trailer.  Patient denies head injury or loss of consciousness.  Denies neck pain.  Patient states she called back to her bed where she remained most of yesterday until the pain became too bad and presented to the outside facility overnight.    Denies tobacco, alcohol or illicit drug use.    REVIEW OF SYSTEMS  See HPI for further details. All other systems are negative.     PAST MEDICAL HISTORY   has a past medical history of Dyspnea (3/22/2012); GERD (gastroesophageal reflux disease) (3/22/2012); Hyperlipidemia (8/3/2011); Insomnia (12/5/2014); Mixed hyperlipidemia (7/27/2015); and Solar keratosis (8/3/2011).    SOCIAL HISTORY  Social History     Social History Main Topics   • Smoking status: Former Smoker     Packs/day: 0.02     Years: 1.00     Types: Cigarettes     Quit date: 2/20/2017   • Smokeless tobacco: Never Used   • Alcohol use Yes      Comment: occasional   • Drug use: No   • Sexual activity: Not Currently       SURGICAL HISTORY   has a past surgical history that includes primary c section; shoulder arthroscopy; and lysis adhesions gyn.    CURRENT MEDICATIONS  Home Medications     Reviewed by Sarah Alaniz R.N. (Registered Nurse) on 11/11/18 at 0642  Med List Status: Partial   Medication Last Dose Status   ALPRAZolam (XANAX) 1 MG Tab 11/8/2018 Active                ALLERGIES  Allergies   Allergen Reactions   • Percocet [Oxycodone-Acetaminophen]      Itch    • Statins [Hmg-Coa-R Inhibitors]          PHYSICAL EXAM  VITAL SIGNS: /70   Pulse 82    "Temp 36 °C (96.8 °F) (Temporal)   Resp 15   Ht 1.727 m (5' 8\")   Wt 77.4 kg (170 lb 10.2 oz)   LMP 11/21/2012 Comment: last LMP 3 years ago  SpO2 100%   BMI 25.95 kg/m²   Pulse ox interpretation: I interpret this pulse ox as normal.  Constitutional: Alert.  Mild distress secondary discomfort of the right hip and groin.  HENT: Normocephalic, atraumatic, no cephalohematoma. Bilateral external ears normal. Nose normal. No oral trauma.    Eyes: Pupils are equal and reactive, Conjunctiva normal.   Neck: No tenderness to palpation midline, no step-offs.  Normal range of motion without pain or resistance.   Cardiovascular: Regular rate and rhythm, no murmurs. Distal pulses intact.    Thorax & Lungs: Slightly diminished bilaterally, pain with deep inspiration otherwise normal breath sounds, No respiratory distress, No wheezing/rales/robchi.  Tenderness right anterior lateral and posterior chest without crepitus or hematoma.  Superficial linear abrasion to right breast.  Very superficial abrasion and early ecchymosis to the right posterior lateral back, lateral to the scapula.  Abdomen: Soft, non-distended, non-tender, no palpable or pulsatile masses. No peritoneal signs. No seatbelt sign or abrasions/ecchymosis.  Skin: Warm, Dry.    Back: No midline thoracic or lumbar tenderness, no step-offs.    Musculoskeletal: Pelvis stable, no diastases.  Tender to palpation at the right anterior lateral hip.  Lower extremities flexed at hip and knees in position of comfort.  Extended slightly for exam.  No shortening or rotation.  No crepitus or deformity at foot, ankle, leg or knee.  Neurologic: Alert and oriented x4.  Speech clear and cohesive.  Moves 4 extremities spontaneously although limited on the right lower extremity secondary discomfort.. GCS 15.  Psychiatric: Anxious otherwise cooperative       DIAGNOSTIC STUDIES / PROCEDURES  LABS  Results for orders placed or performed during the hospital encounter of 11/11/18 "   DIAGNOSTIC ALCOHOL   Result Value Ref Range    Diagnostic Alcohol 0.00 0.00 g/dL   CBC WITHOUT DIFFERENTIAL   Result Value Ref Range    WBC 11.9 (H) 4.8 - 10.8 K/uL    RBC 4.27 4.20 - 5.40 M/uL    Hemoglobin 13.9 12.0 - 16.0 g/dL    Hematocrit 41.0 37.0 - 47.0 %    MCV 96.0 81.4 - 97.8 fL    MCH 32.6 27.0 - 33.0 pg    MCHC 33.9 33.6 - 35.0 g/dL    RDW 39.3 35.9 - 50.0 fL    Platelet Count 192 164 - 446 K/uL    MPV 10.0 9.0 - 12.9 fL   COMP METABOLIC PANEL   Result Value Ref Range    Sodium 140 135 - 145 mmol/L    Potassium 3.9 3.6 - 5.5 mmol/L    Chloride 109 96 - 112 mmol/L    Co2 23 20 - 33 mmol/L    Anion Gap 8.0 0.0 - 11.9    Glucose 118 (H) 65 - 99 mg/dL    Bun 11 8 - 22 mg/dL    Creatinine 0.75 0.50 - 1.40 mg/dL    Calcium 8.7 8.5 - 10.5 mg/dL    AST(SGOT) 47 (H) 12 - 45 U/L    ALT(SGPT) 85 (H) 2 - 50 U/L    Alkaline Phosphatase 54 30 - 99 U/L    Total Bilirubin 0.8 0.1 - 1.5 mg/dL    Albumin 3.9 3.2 - 4.9 g/dL    Total Protein 6.4 6.0 - 8.2 g/dL    Globulin 2.5 1.9 - 3.5 g/dL    A-G Ratio 1.6 g/dL   PROTHROMBIN TIME   Result Value Ref Range    PT 14.1 12.0 - 14.6 sec    INR 1.07 0.87 - 1.13   APTT   Result Value Ref Range    APTT 24.4 (L) 24.7 - 36.0 sec   HCG QUAL SERUM   Result Value Ref Range    Beta-Hcg Qualitative Serum Negative Negative   ESTIMATED GFR   Result Value Ref Range    GFR If African American >60 >60 mL/min/1.73 m 2    GFR If Non African American >60 >60 mL/min/1.73 m 2     RADIOLOGY  CT-PELVIS W/O PLUS RECONS   Final Result         1.  No acute abnormality.   2.  Sclerotic lesions in the LEFT ischium and RIGHT iliac bone has a somewhat atypical appearance for bone islands. Consider bone scan to assess for the possibility of metastatic disease if clinically appropriate      DX-CHEST-LIMITED (1 VIEW)   Final Result      Tiny RIGHT pneumothorax now visible by radiograph, not definitely changed since the CT        CT chest without contrast from outside facility  Impression       1.  Tiny RIGHT  pneumothorax  2.  Fractures of RIGHT ribs 3 through 6  3.  5 mm RIGHT middle lobe pulmonary nodule  4.  Hepatic steatosis         COURSE & MEDICAL DECISION MAKING  Patient seen and evaluated in trauma room upon arrival.  Significant discomfort, mild distress secondary to right hip and groin pain.  Bilateral lower extremities are flexed at hip and knees in position of comfort.  Pain improved with fentanyl in the trauma room.  Trauma exam as described above.  Chest wall tenderness without crepitus, hematoma or flail segment.  Supplemental oxygen was otherwise appropriate saturation.  No tachycardia or hypotension.  Abdominal exam is benign.    Repeat chest x-ray without gross abnormality, large pneumothorax or mediastinal shift.    CT chest from outside facility with known tiny right pneumothorax and fractures of ribs 3 through 6.    Hip x-ray was unrevealing, given persistent right hip and groin discomfort we will add CT the pelvis without contrast.  Thoracic and lumbar spine evaluation is unremarkable.    CT of the pelvis is unrevealing for acute pathology, MRI may be indicated for persistent pain, however concerning our sclerotic bony lesions noted on this scan, as well as the pulmonary nodule on the CT further evaluation is recommended.    Patient will be admitted to the trauma services for further evaluation and treatment.    7:21 AM Dr. Johnson is where the patient and agreeable to admission.      FINAL IMPRESSION  (S22.41XA) Closed fracture of multiple ribs of right side, initial encounter  (primary encounter diagnosis)  (J93.9) Pneumothorax on right  (S20.211A) Contusion of right chest wall, initial encounter  (M25.551) Right hip pain  (R10.31) Right groin pain  (W19.XXXA) Fall, initial encounter      Electronically signed by: Deb Banda, 11/11/2018 7:17 AM      This dictation was created using voice recognition software. The accuracy of the dictation is limited to the abilities of the software. I expect  there may be some errors of grammar and possibly content. The nursing notes were reviewed and certain aspects of this information were incorporated into this note.

## 2018-11-11 NOTE — PROGRESS NOTES
Report received from [facility] in the Ed.  Pt arrived to unit via transport  Assessment complete.  A&O x 4. Patient calls appropriately.  Patient declined to ambulate from gurney to bed, pt states it's related to pain, slide board was used.    Patient has 8/10 pain. Medication per MAR, encouraged repositioning.   Denies N&V. Tolerating clear  liquid diet.  Pt 96% on 2 L nasal cannula.   Patient denies SOB.  Patient resting in bed.  Review plan with of care with patient. Call light and personal belongings with in reach. Hourly rounding in place. All needs met at this time.

## 2018-11-11 NOTE — ED NOTES
"Chief Complaint   Patient presents with   • T-5000 FALL   • Groin Pain   • Rib Pain     Pt arrives via [facility].   Reports tripping over her dog and falling into a jet ski that was parked in her driveway at around 1030 yesterday.      MD at bedside, C-spine manually cleared.  No C-collar placed per MD.     Ht 1.727 m (5' 8\")   Wt 77.4 kg (170 lb 11.9 oz)   LMP 11/21/2012 Comment: last LMP 3 years ago  Breastfeeding? No   BMI 25.96 kg/m²     "

## 2018-11-11 NOTE — PROGRESS NOTES
Two RN skin check complete.     Abrasion noted to left lateral side of the ankle.     No other skin issues noted.

## 2018-11-11 NOTE — ED TRIAGE NOTES
Beatriz Crespo  58 y.o.  Chief Complaint   Patient presents with   • Trauma Green     GLF, - LOC     BIB EMS from [facility] for above. Patient A & O x 4, GCS 15.     Injuries:  -R rib fracture x 4 with pneumothorax  -R breast abrasion  -R scapular abrasion  -R hip pain    Patient medicated for pain in trauma bay.    Report given to Rodrigo Lovett.

## 2018-11-11 NOTE — ASSESSMENT & PLAN NOTE
11/12 CXR Tiny right pneumothorax, not visulized.  No chest tube required at this time.  Supplemental oxygen to maintain oxygenation >95%. Aggressive pulmonary hygiene.

## 2018-11-11 NOTE — CARE PLAN
Problem: Pain Management  Goal: Pain level will decrease to patient's comfort goal  Outcome: PROGRESSING AS EXPECTED  PRN and scheduled medication in use.   Heat/cold discussed with patient as well as repositioning discussed- pt agreeable to all interventions at this time.       Problem: Mobility  Goal: Risk for activity intolerance will decrease  Outcome: PROGRESSING SLOWER THAN EXPECTED  Discussed importance of mobility and getting OOB with patient.   Pt states pain in her R groin is too intense and she is declining to get OOB.   Will continue to encourage and work with patient to encourage ambulation and mobility.

## 2018-11-11 NOTE — ASSESSMENT & PLAN NOTE
Complains of significant right pelvic pain.  Xrays at sending facility clear of acute injury.  11/11 CT of pelvis no acute findngs. Diagnostic imaging right femur negative for fracture.  CT pelvis Sclerotic lesions in the LEFT ischium and RIGHT iliac bone has a somewhat atypical appearance for bone islands. Consider bone scan to assess for the possibility of metastatic disease if clinically appropriate

## 2018-11-11 NOTE — DISCHARGE INSTRUCTIONS
You were seen in the emergency department after a fall.  You are found to have rib fractures and pneumothorax.  You are being transferred to the [facility] for trauma evaluation.

## 2018-11-11 NOTE — ED PROVIDER NOTES
ED Provider Note      Means of Arrival: EMS  History obtained from: Patient, EMS  History limited by: Nothing    CHIEF COMPLAINT  Chief Complaint   Patient presents with   • T-5000 FALL   • Groin Pain   • Rib Pain       HPI  Beartiz Crespo is a 58 y.o. female who presents with multiple areas of pain after fall.  Patient reports that yesterday morning approximately 10 AM, she tripped over her dog and landed on her right side on her jet ski on a trailer.  Since that time, she is only been able to crawl and lay in her bed.  She reports significant pain to the right lower portion of her chest wall.  Reports pain is worse with deep breaths, reports crepitance.  She reports shortness of breath secondary to pain.  Denies any nausea, vomiting, abdominal pain.  She reports bilateral hip pain, worse with movement of her legs.  Denies any thigh, knee, ankle pain.  Denies any paresthesias.  She does report neck pain, however the pain onset gradually after her fall and involves the lateral aspects of her neck, not midline.  Denies any blood thinner use.    REVIEW OF SYSTEMS  CONSTITUTIONAL:  No fever.  CARDIOVASCULAR:  No palpitations  RESPIRATORY:  see HPI  GASTROINTESTINAL:  No abdominal pain.  GENITOURINARY:   No dysuria.  MUSCULOSKELETAL:  see HPI  SKIN:  No rash or suspicious lesions.  NEUROLOGIC:   No headache.  ENDOCRINE:  No facial edema.  HEMATOLOGIC:  No abnormal bleeding.       See HPI for further details.   All other systems are negative.     PAST MEDICAL HISTORY  Past Medical History:   Diagnosis Date   • Dyspnea 3/22/2012   • GERD (gastroesophageal reflux disease) 3/22/2012   • Hyperlipidemia 8/3/2011   • Insomnia 12/5/2014   • Mixed hyperlipidemia 7/27/2015   • Solar keratosis 8/3/2011       FAMILY HISTORY  Family History   Problem Relation Age of Onset   • Hypertension Mother    • Hyperlipidemia Mother    • Diabetes Mother         Dm type 1   • Hypertension Father    • Hyperlipidemia Father    • Cancer  "Paternal Grandmother         Breast cancer mid 70s       SOCIAL HISTORY   reports that she quit smoking about 20 months ago. Her smoking use included Cigarettes. She has a 0.02 pack-year smoking history. She has never used smokeless tobacco. She reports that she drinks alcohol. She reports that she does not use drugs.    SURGICAL HISTORY  Past Surgical History:   Procedure Laterality Date   • LYSIS ADHESIONS GYN     • PRIMARY C SECTION     • SHOULDER ARTHROSCOPY         CURRENT MEDICATIONS  Home Medications     Reviewed by Jorge A Haq R.N. (Registered Nurse) on 11/11/18 at 0352  Med List Status: Partial   Medication Last Dose Status   ALPRAZolam (XANAX) 1 MG Tab 11/8/2018 Active                ALLERGIES  Allergies   Allergen Reactions   • Percocet [Oxycodone-Acetaminophen]      Itch    • Statins [Hmg-Coa-R Inhibitors]        PHYSICAL EXAM  VITAL SIGNS: Pulse 82   Ht 1.727 m (5' 8\")   Wt 77.4 kg (170 lb 11.9 oz)   LMP 11/21/2012 Comment: last LMP 3 years ago  SpO2 90%   Breastfeeding? No   BMI 25.96 kg/m²    Gen: Alert, oriented  HENT: No racoon eyes, hemotympanum, septal hematoma, facial instability  Eye: EOMI, no chemosis, PERRL  Neck: trachea midline, no midline tenderness.  Bilateral paraspinous muscle tenderness.  Patient spontaneously ranges neck  Resp: no respiratory distress, CTAB, right lateral inferior chest wall tenderness  CV: No JVD, RRR. no m/r/g, + peripheral pulses  Abd: soft, non-distended, non-tender. No ecchymosis  Back: no spinal tenderness or deformities  Ext: No focal tenderness bilateral upper extremities.  Tenderness palpation bilateral hips without crepitance or deformity.  No thigh, knee, calf, ankle or foot tenderness.  Distal CSM is intact.  No pain with axial loading of bilateral femurs.  Pelvis stable  Psych: normal mood  Neuro: speech fluent, moves all extremities. GCS 15          RADIOLOGY/PROCEDURES  DX-HIP-BILATERAL-WITH PELVIS-3/4 VIEWS   Final Result      1.  No " radiographic evidence of acute traumatic injury.   2.  Mild BILATERAL hip osteoarthritis      CT-CHEST (THORAX) W/O   Final Result      1.  Tiny RIGHT pneumothorax   2.  Fractures of RIGHT ribs 3 through 6   3.  5 mm RIGHT middle lobe pulmonary nodule   4.  Hepatic steatosis      RECOMMENDATION FOR PULMONARY NODULE EVALUATION:   Low Risk: No routine follow-up      High Risk: Optional CT at 12 months      Comments: Nodules less than 6 mm do not require routine follow-up, but certain patients at high risk with suspicious nodule morphology, upper lobe location, or both may warrant 12-month follow-up.      Low Risk - Minimal or absent history of smoking and of other known risk factors.      High Risk - History of smoking or of other known risk factors.      Note: These recommendations do not apply to lung cancer screening, patients with immunosuppression, or patients with known primary cancer.      Fleischner Society 2017 Guidelines for Management of Incidentally Detected Pulmonary Nodules in Adults      Findings were discussed with SIMIN BROWNE on 11/11/2018 5:22 AM.                LABS  Results for orders placed or performed during the hospital encounter of 11/11/18   CBC WITH DIFFERENTIAL   Result Value Ref Range    WBC 11.9 (H) 4.8 - 10.8 K/uL    RBC 4.51 4.20 - 5.40 M/uL    Hemoglobin 14.5 12.0 - 16.0 g/dL    Hematocrit 41.4 37.0 - 47.0 %    MCV 91.8 81.4 - 97.8 fL    MCH 32.2 27.0 - 33.0 pg    MCHC 35.0 33.6 - 35.0 g/dL    RDW 37.6 35.9 - 50.0 fL    Platelet Count 206 164 - 446 K/uL    MPV 9.9 9.0 - 12.9 fL    Neutrophils-Polys 75.20 (H) 44.00 - 72.00 %    Lymphocytes 14.80 (L) 22.00 - 41.00 %    Monocytes 8.80 0.00 - 13.40 %    Eosinophils 0.20 0.00 - 6.90 %    Basophils 0.30 0.00 - 1.80 %    Immature Granulocytes 0.70 0.00 - 0.90 %    Nucleated RBC 0.00 /100 WBC    Neutrophils (Absolute) 8.96 (H) 2.00 - 7.15 K/uL    Lymphs (Absolute) 1.77 1.00 - 4.80 K/uL    Monos (Absolute) 1.05 (H) 0.00 - 0.85 K/uL    Eos  (Absolute) 0.02 0.00 - 0.51 K/uL    Baso (Absolute) 0.04 0.00 - 0.12 K/uL    Immature Granulocytes (abs) 0.08 0.00 - 0.11 K/uL    NRBC (Absolute) 0.00 K/uL   COMP METABOLIC PANEL   Result Value Ref Range    Sodium 137 135 - 145 mmol/L    Potassium 3.8 3.6 - 5.5 mmol/L    Chloride 104 96 - 112 mmol/L    Co2 21 20 - 33 mmol/L    Anion Gap 12.0 (H) 0.0 - 11.9    Glucose 156 (H) 65 - 99 mg/dL    Bun 11 8 - 22 mg/dL    Creatinine 0.94 0.50 - 1.40 mg/dL    Calcium 8.9 8.4 - 10.2 mg/dL    AST(SGOT) 67 (H) 12 - 45 U/L    ALT(SGPT) 104 (H) 2 - 50 U/L    Alkaline Phosphatase 62 30 - 99 U/L    Total Bilirubin 1.2 0.1 - 1.5 mg/dL    Albumin 3.9 3.2 - 4.9 g/dL    Total Protein 6.7 6.0 - 8.2 g/dL    Globulin 2.8 1.9 - 3.5 g/dL    A-G Ratio 1.4 g/dL   ESTIMATED GFR   Result Value Ref Range    GFR If African American >60 >60 mL/min/1.73 m 2    GFR If Non African American >60 >60 mL/min/1.73 m 2         COURSE & MEDICAL DECISION MAKING  Pertinent Labs & Imaging studies reviewed. (See chart for details)    Patient presents mechanical fall with concern for multiple injuries.  Given the relatively large distribution of area for her chest wall pain, I will perform a CT scan of the chest to evaluate for suspected multiple bone fractures, low suspicion for pleural segment on my exam.  Patient does not have any abdominal tenderness to suggest intra-abdominal injury.  Will obtain x-rays of the hips and pelvis to evaluate for pelvic fracture, hip fracture.  Low clinical suspicion for cervical spine injury given the late onset of pain, ability to spontaneously ranges neck, pain greater over lateral muscles, no deformities.  Low suspicion for acute intracranial injuries    4:59 AM  At this time, delays in imaging due to significant patient pain.  She has had multiple rounds of morphine, will try benzodiazepines for muscle relaxation, and if unsuccessful pain dose ketamine.    5:33 AM  Discussed with radiologist, patient has 4 rib fractures on  the right and small pneumothorax.  The pneumothorax is sufficiently small that I do not believe the patient will require chest tube at this time.  This was discussed with general surgery, Dr. Russ who recommends transfer to [facility] for trauma evaluation.    5:35 AM  Case to discuss with Dr. Banda, who will accept the patient. Will transfer at this time on nonrebreather oxygen    FINAL IMPRESSION  1. Fall, initial encounter    2. Closed fracture of four ribs of right side, initial encounter    3. Traumatic pneumothorax, initial encounter         Addendum:  Presentation concerning for possible surgical etiology of the patient's pain given her trauma.  Due to this, the patient is n.p.o.  She has not been consuming much fluids in the prior day, I am concerned that she is somewhat dehydrated.  Patient will be started on IV fluids.  Patient was transferred to [facility] prior to completion of fluids.

## 2018-11-11 NOTE — H&P
TRAUMA HISTORY AND PHYSICAL    CHIEF COMPLAINT: Ground-level fall    HISTORY OF PRESENT ILLNESS: The patient is a 58-year-old woman who fell yesterday against a jet ski.  She experienced increasing pain and was seen  at an outside facility.  Injuries were identified. The patient was triaged as a Trauma Green Transfer in accordance with established pre hospital protocols. An expeditious primary and secondary survey with required adjuncts was conducted. See Trauma Narrator for full details.    PAST MEDICAL HISTORY:  has a past medical history of Dyspnea (3/22/2012); GERD (gastroesophageal reflux disease) (3/22/2012); Hyperlipidemia (8/3/2011); Insomnia (12/5/2014); Mixed hyperlipidemia (7/27/2015); and Solar keratosis (8/3/2011). She also has no past medical history of Breast cancer (HCC).     PAST SURGICAL HISTORY:  has a past surgical history that includes primary c section; shoulder arthroscopy; and lysis adhesions gyn.    ALLERGIES:   Allergies   Allergen Reactions   • Percocet [Oxycodone-Acetaminophen]      Itch    • Statins [Hmg-Coa-R Inhibitors]        CURRENT MEDICATIONS:    Home Medications     Reviewed by Katey Good (Pharmacy Tech) on 11/11/18 at 0746  Med List Status: Complete   Medication Last Dose Status   ALPRAZolam (XANAX) 1 MG Tab 11/9/2018 Active              FAMILY HISTORY: family history includes Cancer in her paternal grandmother; Diabetes in her mother; Hyperlipidemia in her father and mother; Hypertension in her father and mother.    SOCIAL HISTORY:  reports that she quit smoking about 20 months ago. Her smoking use included Cigarettes. She has a 0.02 pack-year smoking history. She has never used smokeless tobacco. She reports that she drinks alcohol. She reports that she does not use drugs.    REVIEW OF SYSTEMS:  Is negative with the exception of the aforementioned details in the history of present illness, past medical history, and past surgical history in accordance with CMS  "guidelines.    PHYSICAL EXAMINATION:     CONSTITUTIONAL:     Vital Signs: Blood pressure 103/70, pulse 83, temperature 36 °C (96.8 °F), temperature source Temporal, resp. rate 15, height 1.727 m (5' 8\"), weight 77.4 kg (170 lb 10.2 oz), last menstrual period 11/21/2012, SpO2 95 %, not currently breastfeeding.   General Appearance: appears stated age, is in no apparent distress.  HEENT:     No significant external craniofacial trauma. The pupils are equal, round, and react to light. The extraocular muscles are intact. The ear canals and tympanic membranes are normal. The nares and oropharynx are clear. The midface and jaw are stable. No malocclusion is evident.  NECK:    The cervical spine is supple and nontender. Normal range of motion . The trachea is midline. There is no jugulovenous distention or cervical crepitance.   RESPIRATORY:   Inspection: Unlabored respirations, no intercostal retractions, paradoxical motion, or accessory muscle use.   Palpation:  The chest is nontender. The clavicles are nondeformed.   Auscultation: normal.  CARDIOVASCULAR:   Auscultation: regular rate and rhythm.   Peripheral Pulses: Normal.   ABDOMEN:   Abdomen is soft, nontender, without organomegaly or masses.  MUSCULOSKELETAL:   The pelvis is stable.  No significant angulation, deformity, or soft tissue injury involving the upper and lower extremities. Normal range of motion.   BACK:   inspection of back is normal.  SKIN:    Normal.  NEUROLOGIC:    GCS 15. no focal deficits noted, mental status intact.  PSYCHIATRIC:   Grossly normal.    LABORATORY VALUES:   Recent Labs      11/11/18 0359 11/11/18   0636   WBC  11.9*  11.9*   RBC  4.51  4.27   HEMOGLOBIN  14.5  13.9   HEMATOCRIT  41.4  41.0   MCV  91.8  96.0   MCH  32.2  32.6   MCHC  35.0  33.9   RDW  37.6  39.3   PLATELETCT  206  192   MPV  9.9  10.0     Recent Labs      11/11/18 0359 11/11/18   0636   SODIUM  137  140   POTASSIUM  3.8  3.9   CHLORIDE  104  109   CO2  21  23 "   GLUCOSE  156*  118*   BUN  11  11   CREATININE  0.94  0.75   CALCIUM  8.9  8.7     Recent Labs      11/11/18   0359  11/11/18   0636   ASTSGOT  67*  47*   ALTSGPT  104*  85*   TBILIRUBIN  1.2  0.8   ALKPHOSPHAT  62  54   GLOBULIN  2.8  2.5   INR   --   1.07     Recent Labs      11/11/18   0636   APTT  24.4*   INR  1.07        IMAGING:   CT-PELVIS W/O PLUS RECONS   Final Result         1.  No acute abnormality.   2.  Sclerotic lesions in the LEFT ischium and RIGHT iliac bone has a somewhat atypical appearance for bone islands. Consider bone scan to assess for the possibility of metastatic disease if clinically appropriate      DX-CHEST-LIMITED (1 VIEW)   Final Result      Tiny RIGHT pneumothorax now visible by radiograph, not definitely changed since the CT      DX-FEMUR-2+ RIGHT    (Results Pending)       Problems:  Trauma  Mechanical fall at home on 11/10. To [facility] ED for evaluation for increasing rib cage pain and shortness of breath.  Trauma Green Transfer Activation.   Hank Johnson MD. Trauma Surgery.    Pneumothorax, traumatic  Tiny right pneumothorax.  No chest tube required at this time.  Supplemental oxygen to maintain oxygenation >95%. Aggressive pulmonary hygiene. Serial chest radiographs.       Fracture of multiple ribs of right side  Fractures of right ribs 3 through 6.  Aggressive multimodal pain management, and pulmonary hygiene. Serial chest radiographs.       Pulmonary nodule  Incidental finding 5 mm right middle lobe pulmonary nodule  Follow up outpatient      Acute pelvic pain  Complains of significant right pelvic pain.  Xrays at sending facility clear of acute injury.  11/11 CT of pelvis with no acute abnormality. Diagnostic imaging right femur pending    Bone lesion  Sclerotic lesions in the left ischium and right iliac bone has a somewhat atypical appearance for bone islands. Consider bone scan to assess for the possibility of metastatic disease if clinically appropriate.  Follow up  outpatient    Assessment and plan:  58-year-old woman status post a fall yesterday.  She does have injuries includin.  Multiple right-sided rib fractures- multimodal pain management and aggressive pulmonary toilet will be instituted.  She is at risk for physiologic compromise.  2.  Right pelvic pain-imaging including CT of the pelvis has been negative thus far.  Femur films are pending.  3.  Traumatic pneumothorax- this is small and currently a chest tube is not indicated.  Repeat imaging will be obtained.  Overall she is relatively clinically stable and a candidate for floor admission.    DISPOSITION:  General Surgery Unit. Tertiary survey.    CRITICAL CARE TIME: 32 minutes excluding procedures.  ____________________________________   Hank Johnson M.D.    DD: 2018  8:47 AM

## 2018-11-11 NOTE — ASSESSMENT & PLAN NOTE
Fractures of right ribs 3 through 6.  Aggressive multimodal pain management, and pulmonary hygiene.

## 2018-11-11 NOTE — PROGRESS NOTES
"Blood pressure 122/79, pulse 80, temperature 36.8 °C (98.3 °F), resp. rate 18, height 1.727 m (5' 8\"), weight 72.4 kg (159 lb 9.8 oz), last menstrual period 11/21/2012, SpO2 97 %, not currently breastfeeding.      Pain issues addressed.   Added Oxycodone with benadryl.  Pt has no allergic reaction, just itching  Lidocaine patches/neurotin added for rib/ groin pain.     "

## 2018-11-11 NOTE — ASSESSMENT & PLAN NOTE
Mechanical fall at home on 11/10. To [facility] ED for evaluation for increasing rib cage pain and shortness of breath.  Trauma Green Transfer Activation.   Hank Johnson MD. Trauma Surgery.

## 2018-11-12 ENCOUNTER — APPOINTMENT (OUTPATIENT)
Dept: RADIOLOGY | Facility: MEDICAL CENTER | Age: 58
DRG: 200 | End: 2018-11-12
Attending: NURSE PRACTITIONER
Payer: COMMERCIAL

## 2018-11-12 LAB
ALBUMIN SERPL BCP-MCNC: 3.2 G/DL (ref 3.2–4.9)
ALBUMIN/GLOB SERPL: 1.4 G/DL
ALP SERPL-CCNC: 48 U/L (ref 30–99)
ALT SERPL-CCNC: 59 U/L (ref 2–50)
ANION GAP SERPL CALC-SCNC: 5 MMOL/L (ref 0–11.9)
AST SERPL-CCNC: 33 U/L (ref 12–45)
BASOPHILS # BLD AUTO: 0.5 % (ref 0–1.8)
BASOPHILS # BLD: 0.03 K/UL (ref 0–0.12)
BILIRUB SERPL-MCNC: 0.9 MG/DL (ref 0.1–1.5)
BUN SERPL-MCNC: 9 MG/DL (ref 8–22)
CALCIUM SERPL-MCNC: 8.7 MG/DL (ref 8.5–10.5)
CHLORIDE SERPL-SCNC: 104 MMOL/L (ref 96–112)
CO2 SERPL-SCNC: 30 MMOL/L (ref 20–33)
CREAT SERPL-MCNC: 0.73 MG/DL (ref 0.5–1.4)
EOSINOPHIL # BLD AUTO: 0.05 K/UL (ref 0–0.51)
EOSINOPHIL NFR BLD: 0.8 % (ref 0–6.9)
ERYTHROCYTE [DISTWIDTH] IN BLOOD BY AUTOMATED COUNT: 39.5 FL (ref 35.9–50)
GLOBULIN SER CALC-MCNC: 2.3 G/DL (ref 1.9–3.5)
GLUCOSE SERPL-MCNC: 123 MG/DL (ref 65–99)
HCT VFR BLD AUTO: 38.6 % (ref 37–47)
HGB BLD-MCNC: 12.7 G/DL (ref 12–16)
IMM GRANULOCYTES # BLD AUTO: 0.02 K/UL (ref 0–0.11)
IMM GRANULOCYTES NFR BLD AUTO: 0.3 % (ref 0–0.9)
LYMPHOCYTES # BLD AUTO: 1.78 K/UL (ref 1–4.8)
LYMPHOCYTES NFR BLD: 26.8 % (ref 22–41)
MAGNESIUM SERPL-MCNC: 1.9 MG/DL (ref 1.5–2.5)
MCH RBC QN AUTO: 32.3 PG (ref 27–33)
MCHC RBC AUTO-ENTMCNC: 32.9 G/DL (ref 33.6–35)
MCV RBC AUTO: 98.2 FL (ref 81.4–97.8)
MONOCYTES # BLD AUTO: 0.6 K/UL (ref 0–0.85)
MONOCYTES NFR BLD AUTO: 9 % (ref 0–13.4)
NEUTROPHILS # BLD AUTO: 4.16 K/UL (ref 2–7.15)
NEUTROPHILS NFR BLD: 62.6 % (ref 44–72)
NRBC # BLD AUTO: 0 K/UL
NRBC BLD-RTO: 0 /100 WBC
PHOSPHATE SERPL-MCNC: 2.9 MG/DL (ref 2.5–4.5)
PLATELET # BLD AUTO: 157 K/UL (ref 164–446)
PMV BLD AUTO: 10.3 FL (ref 9–12.9)
POTASSIUM SERPL-SCNC: 3.7 MMOL/L (ref 3.6–5.5)
PROT SERPL-MCNC: 5.5 G/DL (ref 6–8.2)
RBC # BLD AUTO: 3.93 M/UL (ref 4.2–5.4)
SODIUM SERPL-SCNC: 139 MMOL/L (ref 135–145)
WBC # BLD AUTO: 6.6 K/UL (ref 4.8–10.8)

## 2018-11-12 PROCEDURE — 700102 HCHG RX REV CODE 250 W/ 637 OVERRIDE(OP): Performed by: NURSE PRACTITIONER

## 2018-11-12 PROCEDURE — A9270 NON-COVERED ITEM OR SERVICE: HCPCS | Performed by: NURSE PRACTITIONER

## 2018-11-12 PROCEDURE — 97162 PT EVAL MOD COMPLEX 30 MIN: CPT

## 2018-11-12 PROCEDURE — G8979 MOBILITY GOAL STATUS: HCPCS | Mod: CI

## 2018-11-12 PROCEDURE — 700101 HCHG RX REV CODE 250: Performed by: NURSE PRACTITIONER

## 2018-11-12 PROCEDURE — 80053 COMPREHEN METABOLIC PANEL: CPT

## 2018-11-12 PROCEDURE — 94760 N-INVAS EAR/PLS OXIMETRY 1: CPT

## 2018-11-12 PROCEDURE — 770006 HCHG ROOM/CARE - MED/SURG/GYN SEMI*

## 2018-11-12 PROCEDURE — 36415 COLL VENOUS BLD VENIPUNCTURE: CPT

## 2018-11-12 PROCEDURE — 97535 SELF CARE MNGMENT TRAINING: CPT

## 2018-11-12 PROCEDURE — G8987 SELF CARE CURRENT STATUS: HCPCS | Mod: CK

## 2018-11-12 PROCEDURE — 71045 X-RAY EXAM CHEST 1 VIEW: CPT

## 2018-11-12 PROCEDURE — G8988 SELF CARE GOAL STATUS: HCPCS | Mod: CI

## 2018-11-12 PROCEDURE — 83735 ASSAY OF MAGNESIUM: CPT

## 2018-11-12 PROCEDURE — 700105 HCHG RX REV CODE 258: Performed by: NURSE PRACTITIONER

## 2018-11-12 PROCEDURE — 84100 ASSAY OF PHOSPHORUS: CPT

## 2018-11-12 PROCEDURE — G8978 MOBILITY CURRENT STATUS: HCPCS | Mod: CJ

## 2018-11-12 PROCEDURE — 85025 COMPLETE CBC W/AUTO DIFF WBC: CPT

## 2018-11-12 PROCEDURE — 97166 OT EVAL MOD COMPLEX 45 MIN: CPT

## 2018-11-12 RX ORDER — METHOCARBAMOL 500 MG/1
500 TABLET, FILM COATED ORAL 4 TIMES DAILY
Status: DISCONTINUED | OUTPATIENT
Start: 2018-11-12 | End: 2018-11-13 | Stop reason: HOSPADM

## 2018-11-12 RX ADMIN — SODIUM CHLORIDE, POTASSIUM CHLORIDE, SODIUM LACTATE AND CALCIUM CHLORIDE: 600; 310; 30; 20 INJECTION, SOLUTION INTRAVENOUS at 13:51

## 2018-11-12 RX ADMIN — CELECOXIB 200 MG: 200 CAPSULE ORAL at 18:04

## 2018-11-12 RX ADMIN — GABAPENTIN 100 MG: 100 CAPSULE ORAL at 13:30

## 2018-11-12 RX ADMIN — OXYCODONE HYDROCHLORIDE 5 MG: 5 TABLET ORAL at 01:54

## 2018-11-12 RX ADMIN — OXYCODONE HYDROCHLORIDE 5 MG: 5 TABLET ORAL at 20:43

## 2018-11-12 RX ADMIN — METHOCARBAMOL 500 MG: 500 TABLET, FILM COATED ORAL at 13:31

## 2018-11-12 RX ADMIN — OXYCODONE HYDROCHLORIDE 5 MG: 5 TABLET ORAL at 06:57

## 2018-11-12 RX ADMIN — METHOCARBAMOL 500 MG: 500 TABLET, FILM COATED ORAL at 10:11

## 2018-11-12 RX ADMIN — CELECOXIB 200 MG: 200 CAPSULE ORAL at 08:22

## 2018-11-12 RX ADMIN — METHOCARBAMOL 500 MG: 500 TABLET, FILM COATED ORAL at 18:03

## 2018-11-12 RX ADMIN — SODIUM CHLORIDE, POTASSIUM CHLORIDE, SODIUM LACTATE AND CALCIUM CHLORIDE: 600; 310; 30; 20 INJECTION, SOLUTION INTRAVENOUS at 00:00

## 2018-11-12 RX ADMIN — LIDOCAINE 1 PATCH: 50 PATCH TOPICAL at 17:03

## 2018-11-12 RX ADMIN — GABAPENTIN 100 MG: 100 CAPSULE ORAL at 18:04

## 2018-11-12 RX ADMIN — GABAPENTIN 100 MG: 100 CAPSULE ORAL at 05:08

## 2018-11-12 RX ADMIN — OXYCODONE HYDROCHLORIDE 5 MG: 5 TABLET ORAL at 16:03

## 2018-11-12 ASSESSMENT — COGNITIVE AND FUNCTIONAL STATUS - GENERAL
MOBILITY SCORE: 18
PERSONAL GROOMING: A LITTLE
DRESSING REGULAR UPPER BODY CLOTHING: A LITTLE
SUGGESTED CMS G CODE MODIFIER MOBILITY: CK
SUGGESTED CMS G CODE MODIFIER DAILY ACTIVITY: CK
WALKING IN HOSPITAL ROOM: A LITTLE
DRESSING REGULAR LOWER BODY CLOTHING: A LOT
DAILY ACTIVITIY SCORE: 18
MOVING FROM LYING ON BACK TO SITTING ON SIDE OF FLAT BED: A LITTLE
CLIMB 3 TO 5 STEPS WITH RAILING: A LOT
MOVING TO AND FROM BED TO CHAIR: A LITTLE
STANDING UP FROM CHAIR USING ARMS: A LITTLE
TOILETING: A LITTLE
HELP NEEDED FOR BATHING: A LITTLE

## 2018-11-12 ASSESSMENT — PAIN SCALES - GENERAL
PAINLEVEL_OUTOF10: 5
PAINLEVEL_OUTOF10: 5
PAINLEVEL_OUTOF10: 6
PAINLEVEL_OUTOF10: 5
PAINLEVEL_OUTOF10: 4
PAINLEVEL_OUTOF10: 5
PAINLEVEL_OUTOF10: 5

## 2018-11-12 ASSESSMENT — ENCOUNTER SYMPTOMS
SHORTNESS OF BREATH: 1
NAUSEA: 0
MYALGIAS: 1
DOUBLE VISION: 0
FEVER: 0
ABDOMINAL PAIN: 0
SENSORY CHANGE: 0

## 2018-11-12 ASSESSMENT — LIFESTYLE VARIABLES: SUBSTANCE_ABUSE: 0

## 2018-11-12 ASSESSMENT — GAIT ASSESSMENTS
GAIT LEVEL OF ASSIST: CONTACT GUARD ASSIST
DISTANCE (FEET): 15
DEVIATION: ANTALGIC;DECREASED HEEL STRIKE;DECREASED TOE OFF
ASSISTIVE DEVICE: FRONT WHEEL WALKER

## 2018-11-12 ASSESSMENT — ACTIVITIES OF DAILY LIVING (ADL): TOILETING: INDEPENDENT

## 2018-11-12 NOTE — DISCHARGE PLANNING
Received Choice form at 1523  Agency/Facility Name: [facility]   Referral sent per Choice form @ 6598  For DME walker

## 2018-11-12 NOTE — CARE PLAN
Problem: Communication  Goal: The ability to communicate needs accurately and effectively will improve  Outcome: PROGRESSING AS EXPECTED  Plan of care discussed with patient at beginning of shift.     Problem: Safety  Goal: Will remain free from injury  Outcome: PROGRESSING AS EXPECTED   Bed in lowest position and locked. Frequent rounding in place. Room near nurses station. Call light within reach.

## 2018-11-12 NOTE — CARE PLAN
Problem: Oxygenation:  Goal: Maintain adequate oxygenation dependent on patient condition    Intervention: Manage oxygen therapy by monitoring pulse oximetry and/or ABG values  1L NC      Problem: Hyperinflation:  Goal: Prevent or improve atelectasis    Intervention: Instruct incentive spirometry usage  IS QID 1500ml

## 2018-11-12 NOTE — CARE PLAN
Problem: Venous Thromboembolism (VTW)/Deep Vein Thrombosis (DVT) Prevention:  Goal: Patient will participate in Venous Thrombosis (VTE)/Deep Vein Thrombosis (DVT)Prevention Measures  Patient encouraged to ambulate.     Problem: Pain Management  Goal: Pain level will decrease to patient's comfort goal  Outcome: MET Date Met: 11/12/18  Pain well controlled.

## 2018-11-12 NOTE — THERAPY
"Occupational Therapy Evaluation completed.   Functional Status:  MOD assist LB dressing. SBA mobility with FWW. MIN assist sit to supine.   Plan of Care: Will benefit from Occupational Therapy 3 times per week  Discharge Recommendations:  Equipment: Tub Transfer Bench. Post-acute therapy Discharge to home with outpatient or home health for additional skilled therapy services.    Pt is a 59 y/o female admitted post fall at home resulting in a traumatic pneumothorax, R rib #3-6 fractures, and RLE pain. Pt required MIN assist for bed mobility. Standby assist ambulation and transfers. MOD assist LB dressing this date. Reviewed potential equipment for home use such as a reacher, bedside commode, and tub transfer bench. Pt's spouse works during the day. Daughter who goes to [employer] can check in on pt occasionally and can assist with showers. Walker has been ordered. Recommend home with family assist as needed upon discharge. Pt would benefit from continued skilled OT in acute care to maximize safety and independence in ADLS and mobility prior to d/c.     See \"Rehab Therapy-Acute\" Patient Summary Report for complete documentation.    "

## 2018-11-12 NOTE — CARE PLAN
Problem: Safety  Goal: Will remain free from injury  Patient uses call light appropriately.     Problem: Pain Management  Goal: Pain level will decrease to patient's comfort goal  Monitor pain levels and medicate per MAR. Provide distraction techniques.

## 2018-11-12 NOTE — PROGRESS NOTES
"Assumed care of patient from night shift RN.     Patient is reporting pain of 5/10.    VSS BP (!) 96/48   Pulse 62   Temp 37 °C (98.6 °F)   Resp 16   Ht 1.727 m (5' 8\")   Wt 72.4 kg (159 lb 9.8 oz)   LMP 11/21/2012 Comment: last LMP 3 years ago  SpO2 95%   Breastfeeding? No   BMI 24.27 kg/m²      A & O x 4     Room Air with oxygen saturation of 95%    Last BM: 11/9/18 ( per patient).    Diet: Clear liquid, advance as tolerated.     Voids    PIV: 22 gauge left forearm infusing LR @ 83ml/hr     Skin: abrasion on left ankle.     Mobility: Stand by assist.     POC discussed. All needs are met at this time. Bed is locked and in lowest position, side rails up, call light and belongings are within reach.   "

## 2018-11-13 ENCOUNTER — APPOINTMENT (OUTPATIENT)
Dept: RADIOLOGY | Facility: MEDICAL CENTER | Age: 58
DRG: 200 | End: 2018-11-13
Attending: NURSE PRACTITIONER
Payer: COMMERCIAL

## 2018-11-13 ENCOUNTER — TELEPHONE (OUTPATIENT)
Dept: MEDICAL GROUP | Facility: LAB | Age: 58
End: 2018-11-13

## 2018-11-13 VITALS
HEIGHT: 68 IN | HEART RATE: 61 BPM | BODY MASS INDEX: 24.19 KG/M2 | DIASTOLIC BLOOD PRESSURE: 66 MMHG | SYSTOLIC BLOOD PRESSURE: 111 MMHG | WEIGHT: 159.61 LBS | RESPIRATION RATE: 15 BRPM | TEMPERATURE: 98.2 F | OXYGEN SATURATION: 94 %

## 2018-11-13 PROCEDURE — 97116 GAIT TRAINING THERAPY: CPT

## 2018-11-13 PROCEDURE — A9270 NON-COVERED ITEM OR SERVICE: HCPCS | Performed by: NURSE PRACTITIONER

## 2018-11-13 PROCEDURE — 97530 THERAPEUTIC ACTIVITIES: CPT

## 2018-11-13 PROCEDURE — 700102 HCHG RX REV CODE 250 W/ 637 OVERRIDE(OP): Performed by: NURSE PRACTITIONER

## 2018-11-13 PROCEDURE — 97110 THERAPEUTIC EXERCISES: CPT

## 2018-11-13 PROCEDURE — 700112 HCHG RX REV CODE 229: Performed by: NURSE PRACTITIONER

## 2018-11-13 PROCEDURE — 71045 X-RAY EXAM CHEST 1 VIEW: CPT

## 2018-11-13 RX ORDER — GABAPENTIN 100 MG/1
200 CAPSULE ORAL 3 TIMES DAILY
Qty: 60 CAP | Refills: 0 | Status: SHIPPED | OUTPATIENT
Start: 2018-11-13 | End: 2019-02-21

## 2018-11-13 RX ORDER — METHOCARBAMOL 500 MG/1
500 TABLET, FILM COATED ORAL 4 TIMES DAILY
Qty: 30 TAB | Refills: 0 | Status: SHIPPED | OUTPATIENT
Start: 2018-11-13 | End: 2018-11-13

## 2018-11-13 RX ORDER — LIDOCAINE 50 MG/G
1 PATCH TOPICAL EVERY 24 HOURS
Qty: 10 PATCH | Refills: 0 | Status: SHIPPED | OUTPATIENT
Start: 2018-11-13 | End: 2018-11-13

## 2018-11-13 RX ORDER — DIPHENHYDRAMINE HCL 25 MG
25 TABLET ORAL EVERY 6 HOURS PRN
Qty: 30 TAB | Refills: 0 | COMMUNITY
Start: 2018-11-13 | End: 2019-07-07

## 2018-11-13 RX ORDER — GABAPENTIN 100 MG/1
200 CAPSULE ORAL 3 TIMES DAILY
Qty: 60 CAP | Refills: 0 | Status: SHIPPED | OUTPATIENT
Start: 2018-11-13 | End: 2018-11-13

## 2018-11-13 RX ORDER — OXYCODONE HYDROCHLORIDE 5 MG/1
5 TABLET ORAL EVERY 6 HOURS PRN
Qty: 28 TAB | Refills: 0 | Status: SHIPPED | OUTPATIENT
Start: 2018-11-13 | End: 2018-11-20

## 2018-11-13 RX ORDER — METHOCARBAMOL 500 MG/1
500 TABLET, FILM COATED ORAL 4 TIMES DAILY
Qty: 30 TAB | Refills: 0 | Status: SHIPPED | OUTPATIENT
Start: 2018-11-13 | End: 2019-02-21

## 2018-11-13 RX ORDER — LIDOCAINE 50 MG/G
1 PATCH TOPICAL EVERY 24 HOURS
Qty: 10 PATCH | Refills: 0 | Status: SHIPPED | OUTPATIENT
Start: 2018-11-13 | End: 2019-02-21

## 2018-11-13 RX ADMIN — OXYCODONE HYDROCHLORIDE 5 MG: 5 TABLET ORAL at 06:42

## 2018-11-13 RX ADMIN — METHOCARBAMOL 500 MG: 500 TABLET, FILM COATED ORAL at 08:52

## 2018-11-13 RX ADMIN — CELECOXIB 200 MG: 200 CAPSULE ORAL at 08:16

## 2018-11-13 RX ADMIN — GABAPENTIN 100 MG: 100 CAPSULE ORAL at 06:00

## 2018-11-13 RX ADMIN — DOCUSATE SODIUM 100 MG: 100 CAPSULE, LIQUID FILLED ORAL at 06:43

## 2018-11-13 RX ADMIN — MAGNESIUM HYDROXIDE 30 ML: 400 SUSPENSION ORAL at 06:43

## 2018-11-13 RX ADMIN — POLYETHYLENE GLYCOL 3350 1 PACKET: 17 POWDER, FOR SOLUTION ORAL at 06:43

## 2018-11-13 RX ADMIN — OXYCODONE HYDROCHLORIDE 5 MG: 5 TABLET ORAL at 02:36

## 2018-11-13 ASSESSMENT — COGNITIVE AND FUNCTIONAL STATUS - GENERAL
CLIMB 3 TO 5 STEPS WITH RAILING: A LITTLE
MOVING TO AND FROM BED TO CHAIR: A LITTLE
WALKING IN HOSPITAL ROOM: A LITTLE
SUGGESTED CMS G CODE MODIFIER MOBILITY: CK
STANDING UP FROM CHAIR USING ARMS: A LITTLE
MOBILITY SCORE: 19
MOVING FROM LYING ON BACK TO SITTING ON SIDE OF FLAT BED: A LITTLE

## 2018-11-13 ASSESSMENT — PAIN SCALES - GENERAL
PAINLEVEL_OUTOF10: 6
PAINLEVEL_OUTOF10: 5

## 2018-11-13 ASSESSMENT — ENCOUNTER SYMPTOMS
NAUSEA: 0
DOUBLE VISION: 0
SENSORY CHANGE: 0
ABDOMINAL PAIN: 0
MYALGIAS: 1
FEVER: 0
SHORTNESS OF BREATH: 0

## 2018-11-13 ASSESSMENT — GAIT ASSESSMENTS
GAIT LEVEL OF ASSIST: STAND BY ASSIST
DEVIATION: ANTALGIC;BRADYKINETIC;SHUFFLED GAIT
DISTANCE (FEET): 50
ASSISTIVE DEVICE: FRONT WHEEL WALKER

## 2018-11-13 ASSESSMENT — LIFESTYLE VARIABLES: SUBSTANCE_ABUSE: 0

## 2018-11-13 NOTE — PROGRESS NOTES
Trauma / Surgical Daily Progress Note    Date of Service  11/13/2018    Chief Complaint  58 y.o. female admitted 11/11/2018 with Trauma    Interval Events  Pt wanting to go home  PT to work with pt this am for mobilization.  Pt has one story with two steps in house.   Walker ordered.       Review of Systems  Review of Systems   Constitutional: Negative for fever.   HENT: Negative for hearing loss.    Eyes: Negative for double vision.   Respiratory: Negative for shortness of breath.         RA 94%   Cardiovascular: Positive for chest pain.        Rib fractures   Gastrointestinal: Negative for abdominal pain and nausea.        +BM 11/12   Genitourinary:        Voiding     Musculoskeletal: Positive for joint pain and myalgias.        Right hip pain   Skin: Negative for rash.   Neurological: Negative for sensory change.   Psychiatric/Behavioral: Negative for substance abuse.        Negative cage        Vital Signs  Temp:  [36.1 °C (97 °F)-36.9 °C (98.5 °F)] 36.8 °C (98.2 °F)  Pulse:  [59-80] 61  Resp:  [15-18] 15  BP: ()/(50-68) 111/66    Physical Exam  Physical Exam   Constitutional: She is oriented to person, place, and time. She appears well-nourished.   HENT:   Head: Atraumatic.   Eyes: Pupils are equal, round, and reactive to light.   Neck: Normal range of motion.   Cardiovascular: Normal rate.    Pulmonary/Chest: Effort normal.   Abdominal: Soft.   Musculoskeletal: She exhibits edema and tenderness.   Right hip   Neurological: She is alert and oriented to person, place, and time.   Skin: Skin is warm.   Psychiatric: Her behavior is normal.       Laboratory  No results found for this or any previous visit (from the past 24 hour(s)).    Fluids    Intake/Output Summary (Last 24 hours) at 11/13/18 1002  Last data filed at 11/13/18 0258   Gross per 24 hour   Intake             1762 ml   Output                0 ml   Net             1762 ml       Core Measures & Quality Metrics  Labs reviewed and Medications  reviewed  Barrera catheter: No Barrera      DVT Prophylaxis: Enoxaparin (Lovenox)    Ulcer prophylaxis: Not indicated    Assessed for rehab: Patient returned to prior level of function, rehabilitation not indicated at this time    Total Score: 6    ETOH Screening  CAGE Score: 0  Intervention complete date: 11/12/2018  Patient response to intervention: negative cage.   Total ETOH intervention time: 15 - 30 mintues      Assessment/Plan  Fracture of multiple ribs of right side- (present on admission)   Assessment & Plan    Fractures of right ribs 3 through 6.  Aggressive multimodal pain management, and pulmonary hygiene.        Acute pelvic pain- (present on admission)   Assessment & Plan    Complains of significant right pelvic pain.  Xrays at sending facility clear of acute injury.  11/11 CT of pelvis no acute findngs. Diagnostic imaging right femur negative for fracture.  CT pelvis Sclerotic lesions in the LEFT ischium and RIGHT iliac bone has a somewhat atypical appearance for bone islands. Consider bone scan to assess for the possibility of metastatic disease if clinically appropriate     Pneumothorax, traumatic- (present on admission)   Assessment & Plan    11/12 CXR Tiny right pneumothorax, not visulized.  No chest tube required at this time.  Supplemental oxygen to maintain oxygenation >95%. Aggressive pulmonary hygiene.        Bone lesion- (present on admission)   Assessment & Plan    Sclerotic lesions in the left ischium and right iliac bone has a somewhat atypical appearance for bone islands. Consider bone scan to assess for the possibility of metastatic disease if clinically appropriate.  Follow up outpatient.     Pulmonary nodule- (present on admission)   Assessment & Plan    Incidental finding 5 mm right middle lobe pulmonary nodule  Follow up outpatient       Trauma- (present on admission)   Assessment & Plan    Mechanical fall at home on 11/10. To [facility] ED for evaluation for increasing rib cage pain  and shortness of breath.  Trauma Green Transfer Activation.   Hank Johnson MD. Trauma Surgery.         Discussed patient condition with RN, Patient and trauma surgery. Dr. Johnson    Seen on rounds  Doing well clinically  Discharge when mobility sufficient  Discussed with pt and Scot Johnson MD

## 2018-11-13 NOTE — THERAPY
Physical Therapy Evaluation completed.   Bed Mobility:  Supine to Sit:  (presented EOB)  Transfers: Sit to Stand: Stand by Assist  Gait: Level Of Assist: Contact Guard Assist with Front-Wheel Walker       Plan of Care: Will benefit from Physical Therapy 5 times per week  Discharge Recommendations: Equipment: Front-Wheel Walker. Post-acute therapy Recommend outpatient  services for continued physical therapy services.    Ms. Crespo is a 59 y/o female who presents to acute secondary to fall at home onto [facility]", found to have R 3-6 rib fractures and R pneumothorax. Femur/pelvis imaging negative for fracture despite high pain. Palpation at muscle belly of adductor longus reproduced patient's pain. Significant increase in edema compared to L LE combined with induration. Suspect involvement into vastus medialis as pain with palpation inferior to adductor mass combined with end range knee extension pain. Pt could not recall position of LE in fall. Based on negative imaging and results of physical exam pt appears to have a soft tissue injury in the aforementioned musculature. Discussed how inflammation increases pain and can trigger the spasms she has been experiencing. Educated her on importance of mobility to use decrease this inflammation and restore normal muscle patterns. Progressed patient through AAROM and AROM therex of knee extension and hip flexion while seated. Improvement in ROM and pain with increased reps. Pt then able to tolerate standing and perform standing ROM. Initiated gait training with FWW focusing on knee extensor mechanism and short steps to decrease load on adductors. Rapid improvement in gait. Created HEP handout with discussed exercises. Pt demonstrated good understanding on need to mobilize regularly to decrease pain. Recommend ambulate with nursing staff with FWW. Also recommend ice to R adductors to decrease inflammation. Anticipate decreased pain with increased mobility. Likely 1-2 more  "physical therapy sessions to progress gait and insure pt can ascend stairs into home. Her deficits can then be managed by outpatient physical therapy services.     See \"Rehab Therapy-Acute\" Patient Summary Report for complete documentation.     "

## 2018-11-13 NOTE — CARE PLAN
Problem: Communication  Goal: The ability to communicate needs accurately and effectively will improve  Outcome: PROGRESSING AS EXPECTED  Plan of care discussed with patient at beginning of shift.     Problem: Respiratory:  Goal: Respiratory status will improve  Outcome: PROGRESSING AS EXPECTED  Patient self- motivated to use incentive spirometer. Patient pulling 2500.     Problem: Mobility  Goal: Risk for activity intolerance will decrease  Outcome: PROGRESSING SLOWER THAN EXPECTED  Patient able to ambulate to restroom and back. Requires front wheel walker for unsteady gait and pain.

## 2018-11-13 NOTE — DISCHARGE PLANNING
Anticipated Discharge Disposition: Home with walker    Action: RN CM met with pt at bedside, choice form completed.  Faxed to [facility].  Pt states she anticipates no other needs.    Barriers to Discharge: delivery of needed DME    Plan: RN CM to follow up with delivery of walker

## 2018-11-13 NOTE — PROGRESS NOTES
"Assumed care of patient from night shift RN.     Patient is reporting pain of 6/10.    VSS /68   Pulse 65   Temp 36.9 °C (98.5 °F)   Resp 18   Ht 1.727 m (5' 8\")   Wt 72.4 kg (159 lb 9.8 oz)   LMP 11/21/2012 Comment: last LMP 3 years ago  SpO2 94%   Breastfeeding? No   BMI 24.27 kg/m²     A & O x 4    Room Air    Last BM: 11/12    +void    Regular diet     PIV: 22 gauge left forearm. Saline locked.     Skin: intact. Small abrasion on left ankle    Mobility: standby assist with FWW.    POC discussed. All needs met at this time. Bed is locked and in lowest position. Call light and belongings are within reach. Hourly rounding in place.       "

## 2018-11-13 NOTE — PROGRESS NOTES
Patient discharged with daughter.     Education provided. All questions answered.    Prescriptions provided.

## 2018-11-13 NOTE — THERAPY
"Physical Therapy Treatment completed.   Bed Mobility:  Supine to Sit: Supervised  Transfers: Sit to Stand: Supervised  Gait: Level Of Assist: Stand by Assist with Front-Wheel Walker       Plan of Care: Will benefit from Physical Therapy 5 times per week  Discharge Recommendations: Equipment: No Equipment Needed. FWW delivered to pt room.    See \"Rehab Therapy-Acute\" Patient Summary Report for complete documentation.     Pt presenting w/ improved functional mobility from previous tx. Pt was able to increase WB through her R LE for improved gait mechanics and tolerance. Pt noted no difference in pain between rest and standing. She was open to education on pain reduction techniques. Pt able to perform stairs w/ FWW and give cues to therapist to simulate cuing . Pt currently is at a level in which she can DC home w/ outpatient follow up.  "

## 2018-11-13 NOTE — CARE PLAN
Problem: Safety  Goal: Will remain free from injury  Outcome: PROGRESSING AS EXPECTED  Patient is using call light appropriately.     Problem: Respiratory:  Goal: Respiratory status will improve  Outcome: PROGRESSING AS EXPECTED  Educated patient on the importance of IS q1hour. Currently pulling 2000.

## 2018-11-13 NOTE — PROGRESS NOTES
Patient decided she wanted to take off the IV fluids. Tolerating PO fluids.  Education provided, patient refused IV fluids, IV fluids disconnected.

## 2018-11-13 NOTE — TELEPHONE ENCOUNTER
Phone Number Called: [phone] (home)     Message: LVM for pot to give us a call back to schedule appointment to go over labs     Left Message for patient to call back: yes

## 2018-11-13 NOTE — PROGRESS NOTES
FW walker was delivered and fitted to patient.  If any further assistance needed, please call extension [phone] or place order for Ortho Technician assistance as a communication order in [employer].

## 2018-11-13 NOTE — DISCHARGE INSTRUCTIONS
Discharge Instructions    Discharged to home by car with relative. Discharged via wheelchair, hospital escort: Yes.  Special equipment needed: Front Wheel Walker    Be sure to schedule a follow-up appointment with your primary care doctor or any specialists as instructed.     Discharge Plan:   Influenza Vaccine Indication: Patient Refuses    I understand that a diet low in cholesterol, fat, and sodium is recommended for good health. Unless I have been given specific instructions below for another diet, I accept this instruction as my diet prescription.   Other diet: regular    Special Instructions: None    · Is patient discharged on Warfarin / Coumadin?   No     Depression / Suicide Risk    As you are discharged from this [facility] facility, it is important to learn how to keep safe from harming yourself.    Recognize the warning signs:  · Abrupt changes in personality, positive or negative- including increase in energy   · Giving away possessions  · Change in eating patterns- significant weight changes-  positive or negative  · Change in sleeping patterns- unable to sleep or sleeping all the time   · Unwillingness or inability to communicate  · Depression  · Unusual sadness, discouragement and loneliness  · Talk of wanting to die  · Neglect of personal appearance   · Rebelliousness- reckless behavior  · Withdrawal from people/activities they love  · Confusion- inability to concentrate     If you or a loved one observes any of these behaviors or has concerns about self-harm, here's what you can do:  · Talk about it- your feelings and reasons for harming yourself  · Remove any means that you might use to hurt yourself (examples: pills, rope, extension cords, firearm)  · Get professional help from the community (Mental Health, Substance Abuse, psychological counseling)  · Do not be alone:Call your Safe Contact- someone whom you trust who will be there for you.  · Call your local CRISIS HOTLINE 007-5038 or  308.653.5732  · Call your local Children's Mobile Crisis Response Team [place] (301) 806-3982 or www."[employer]"  · Call the toll free National Suicide Prevention Hotlines   · National Suicide Prevention Lifeline 523-158-YBYU (0760)  · [employer] Line Network 800-SUICIDE (162-4725)      Rib Fracture  A rib fracture is a break or crack in one of the bones of the ribs. The ribs are a group of long, curved bones that wrap around your chest and attach to your spine. They protect your lungs and other organs in the chest cavity. A broken or cracked rib is often painful, but most do not cause other problems. Most rib fractures heal on their own over time. However, rib fractures can be more serious if multiple ribs are broken or if broken ribs move out of place and push against other structures.  What are the causes?  · A direct blow to the chest. For example, this could happen during contact sports, a car accident, or a fall against a hard object.  · Repetitive movements with high force, such as pitching a baseball or having severe coughing spells.  What are the signs or symptoms?  · Pain when you breathe in or cough.  · Pain when someone presses on the injured area.  How is this diagnosed?  Your caregiver will perform a physical exam. Various imaging tests may be ordered to confirm the diagnosis and to look for related injuries. These tests may include a chest X-ray, computed tomography (CT), magnetic resonance imaging (MRI), or a bone scan.  How is this treated?  Rib fractures usually heal on their own in 1-3 months. The longer healing period is often associated with a continued cough or other aggravating activities. During the healing period, pain control is very important. Medication is usually given to control pain. Hospitalization or surgery may be needed for more severe injuries, such as those in which multiple ribs are broken or the ribs have moved out of place.  Follow these instructions at  home:  · Avoid strenuous activity and any activities or movements that cause pain. Be careful during activities and avoid bumping the injured rib.  · Gradually increase activity as directed by your caregiver.  · Only take over-the-counter or prescription medications as directed by your caregiver. Do not take other medications without asking your caregiver first.  · Apply ice to the injured area for the first 1-2 days after you have been treated or as directed by your caregiver. Applying ice helps to reduce inflammation and pain.  ¨ Put ice in a plastic bag.  ¨ Place a towel between your skin and the bag.  ¨ Leave the ice on for 15-20 minutes at a time, every 2 hours while you are awake.  · Perform deep breathing as directed by your caregiver. This will help prevent pneumonia, which is a common complication of a broken rib. Your caregiver may instruct you to:  ¨ Take deep breaths several times a day.  ¨ Try to cough several times a day, holding a pillow against the injured area.  ¨ Use a device called an incentive spirometer to practice deep breathing several times a day.  · Drink enough fluids to keep your urine clear or pale yellow. This will help you avoid constipation.  · Do not wear a rib belt or binder. These restrict breathing, which can lead to pneumonia.  Get help right away if:  · You have a fever.  · You have difficulty breathing or shortness of breath.  · You develop a continual cough, or you cough up thick or bloody sputum.  · You feel sick to your stomach (nausea), throw up (vomit), or have abdominal pain.  · You have worsening pain not controlled with medications.  This information is not intended to replace advice given to you by your health care provider. Make sure you discuss any questions you have with your health care provider.  Document Released: 12/18/2006 Document Revised: 05/25/2017 Document Reviewed: 02/19/2014  Elsevier Interactive Patient Education © 2017 [employer]

## 2018-11-27 DIAGNOSIS — R74.8 ELEVATED LIVER ENZYMES: ICD-10-CM

## 2018-11-27 DIAGNOSIS — R73.01 IMPAIRED FASTING GLUCOSE: ICD-10-CM

## 2018-11-27 DIAGNOSIS — R73.03 PREDIABETES: ICD-10-CM

## 2018-11-27 DIAGNOSIS — F32.1 MODERATE SINGLE CURRENT EPISODE OF MAJOR DEPRESSIVE DISORDER (HCC): ICD-10-CM

## 2018-11-27 DIAGNOSIS — Z00.00 HEALTH MAINTENANCE EXAMINATION: ICD-10-CM

## 2018-11-27 DIAGNOSIS — E78.2 MIXED HYPERLIPIDEMIA: ICD-10-CM

## 2018-11-27 DIAGNOSIS — F51.01 PRIMARY INSOMNIA: ICD-10-CM

## 2018-11-27 DIAGNOSIS — K75.81 STEATOHEPATITIS, NON-ALCOHOLIC: ICD-10-CM

## 2018-12-28 ENCOUNTER — PATIENT MESSAGE (OUTPATIENT)
Dept: MEDICAL GROUP | Facility: LAB | Age: 58
End: 2018-12-28

## 2019-02-15 ENCOUNTER — HOSPITAL ENCOUNTER (OUTPATIENT)
Dept: RADIOLOGY | Facility: MEDICAL CENTER | Age: 59
End: 2019-02-15
Attending: FAMILY MEDICINE
Payer: COMMERCIAL

## 2019-02-15 DIAGNOSIS — Z12.39 SCREENING BREAST EXAMINATION: ICD-10-CM

## 2019-02-15 LAB
INR PPP: 1 (ref 0.8–1.2)
PROTHROMBIN TIME: 10.4 SEC (ref 9.1–12)

## 2019-02-15 PROCEDURE — 77067 SCR MAMMO BI INCL CAD: CPT

## 2019-02-16 LAB
ALBUMIN SERPL-MCNC: 4.5 G/DL (ref 3.5–5.5)
ALBUMIN/GLOB SERPL: 1.7 {RATIO} (ref 1.2–2.2)
ALP SERPL-CCNC: 72 IU/L (ref 39–117)
ALT SERPL-CCNC: 156 IU/L (ref 0–32)
AST SERPL-CCNC: 126 IU/L (ref 0–40)
BASOPHILS # BLD AUTO: 0 X10E3/UL (ref 0–0.2)
BASOPHILS NFR BLD AUTO: 1 %
BILIRUB SERPL-MCNC: 0.4 MG/DL (ref 0–1.2)
BUN SERPL-MCNC: 12 MG/DL (ref 6–24)
BUN/CREAT SERPL: 16 (ref 9–23)
CALCIUM SERPL-MCNC: 9.4 MG/DL (ref 8.7–10.2)
CHLORIDE SERPL-SCNC: 105 MMOL/L (ref 96–106)
CHOLEST SERPL-MCNC: 328 MG/DL (ref 100–199)
CO2 SERPL-SCNC: 21 MMOL/L (ref 20–29)
CREAT SERPL-MCNC: 0.74 MG/DL (ref 0.57–1)
EOSINOPHIL # BLD AUTO: 0.1 X10E3/UL (ref 0–0.4)
EOSINOPHIL NFR BLD AUTO: 1 %
ERYTHROCYTE [DISTWIDTH] IN BLOOD BY AUTOMATED COUNT: 13 % (ref 12.3–15.4)
GLOBULIN SER CALC-MCNC: 2.6 G/DL (ref 1.5–4.5)
GLUCOSE SERPL-MCNC: 111 MG/DL (ref 65–99)
HBA1C MFR BLD: 5.7 % (ref 4.8–5.6)
HCT VFR BLD AUTO: 44.9 % (ref 34–46.6)
HDLC SERPL-MCNC: 57 MG/DL
HGB BLD-MCNC: 14.8 G/DL (ref 11.1–15.9)
IMM GRANULOCYTES # BLD AUTO: 0 X10E3/UL (ref 0–0.1)
IMM GRANULOCYTES NFR BLD AUTO: 0 %
IMMATURE CELLS  115398: NORMAL
LABORATORY COMMENT REPORT: ABNORMAL
LDLC SERPL CALC-MCNC: 220 MG/DL (ref 0–99)
LYMPHOCYTES # BLD AUTO: 2.6 X10E3/UL (ref 0.7–3.1)
LYMPHOCYTES NFR BLD AUTO: 41 %
MCH RBC QN AUTO: 31.5 PG (ref 26.6–33)
MCHC RBC AUTO-ENTMCNC: 33 G/DL (ref 31.5–35.7)
MCV RBC AUTO: 96 FL (ref 79–97)
MONOCYTES # BLD AUTO: 0.4 X10E3/UL (ref 0.1–0.9)
MONOCYTES NFR BLD AUTO: 7 %
MORPHOLOGY BLD-IMP: NORMAL
NEUTROPHILS # BLD AUTO: 3.2 X10E3/UL (ref 1.4–7)
NEUTROPHILS NFR BLD AUTO: 50 %
NRBC BLD AUTO-RTO: NORMAL %
PLATELET # BLD AUTO: 230 X10E3/UL (ref 150–379)
POTASSIUM SERPL-SCNC: 4.2 MMOL/L (ref 3.5–5.2)
PROT SERPL-MCNC: 7.1 G/DL (ref 6–8.5)
RBC # BLD AUTO: 4.7 X10E6/UL (ref 3.77–5.28)
SODIUM SERPL-SCNC: 141 MMOL/L (ref 134–144)
TRIGL SERPL-MCNC: 255 MG/DL (ref 0–149)
VLDLC SERPL CALC-MCNC: 51 MG/DL (ref 5–40)
WBC # BLD AUTO: 6.4 X10E3/UL (ref 3.4–10.8)

## 2019-02-21 ENCOUNTER — HOSPITAL ENCOUNTER (OUTPATIENT)
Facility: MEDICAL CENTER | Age: 59
End: 2019-02-21
Attending: FAMILY MEDICINE
Payer: COMMERCIAL

## 2019-02-21 ENCOUNTER — OFFICE VISIT (OUTPATIENT)
Dept: MEDICAL GROUP | Facility: LAB | Age: 59
End: 2019-02-21
Payer: COMMERCIAL

## 2019-02-21 VITALS
OXYGEN SATURATION: 95 % | HEIGHT: 68 IN | HEART RATE: 87 BPM | SYSTOLIC BLOOD PRESSURE: 110 MMHG | BODY MASS INDEX: 26.98 KG/M2 | DIASTOLIC BLOOD PRESSURE: 74 MMHG | RESPIRATION RATE: 14 BRPM | WEIGHT: 178 LBS | TEMPERATURE: 97.1 F

## 2019-02-21 DIAGNOSIS — Z01.419 WELL WOMAN EXAM WITH ROUTINE GYNECOLOGICAL EXAM: ICD-10-CM

## 2019-02-21 DIAGNOSIS — R74.8 ELEVATED LIVER ENZYMES: ICD-10-CM

## 2019-02-21 DIAGNOSIS — K64.9 HEMORRHOIDS, UNSPECIFIED HEMORRHOID TYPE: ICD-10-CM

## 2019-02-21 DIAGNOSIS — R73.03 PREDIABETES: ICD-10-CM

## 2019-02-21 DIAGNOSIS — E78.2 MIXED HYPERLIPIDEMIA: ICD-10-CM

## 2019-02-21 DIAGNOSIS — Z11.51 SCREENING FOR HPV (HUMAN PAPILLOMAVIRUS): ICD-10-CM

## 2019-02-21 DIAGNOSIS — Z12.4 SCREENING FOR MALIGNANT NEOPLASM OF CERVIX: ICD-10-CM

## 2019-02-21 PROCEDURE — 99000 SPECIMEN HANDLING OFFICE-LAB: CPT | Performed by: FAMILY MEDICINE

## 2019-02-21 PROCEDURE — 99396 PREV VISIT EST AGE 40-64: CPT | Performed by: FAMILY MEDICINE

## 2019-02-21 PROCEDURE — 87624 HPV HI-RISK TYP POOLED RSLT: CPT

## 2019-02-21 PROCEDURE — 99214 OFFICE O/P EST MOD 30 MIN: CPT | Mod: 25 | Performed by: FAMILY MEDICINE

## 2019-02-21 PROCEDURE — 88175 CYTOPATH C/V AUTO FLUID REDO: CPT

## 2019-02-21 RX ORDER — OMEPRAZOLE 20 MG/1
20 CAPSULE, DELAYED RELEASE ORAL DAILY
COMMUNITY
End: 2020-09-09

## 2019-02-21 ASSESSMENT — PATIENT HEALTH QUESTIONNAIRE - PHQ9
5. POOR APPETITE OR OVEREATING: NOT AT ALL
4. FEELING TIRED OR HAVING LITTLE ENERGY: NOT AT ALL
7. TROUBLE CONCENTRATING ON THINGS, SUCH AS READING THE NEWSPAPER OR WATCHING TELEVISION: NOT AT ALL
SUM OF ALL RESPONSES TO PHQ9 QUESTIONS 1 AND 2: 0
1. LITTLE INTEREST OR PLEASURE IN DOING THINGS: NOT AT ALL
3. TROUBLE FALLING OR STAYING ASLEEP OR SLEEPING TOO MUCH: NOT AT ALL
6. FEELING BAD ABOUT YOURSELF - OR THAT YOU ARE A FAILURE OR HAVE LET YOURSELF OR YOUR FAMILY DOWN: NOT AL ALL
8. MOVING OR SPEAKING SO SLOWLY THAT OTHER PEOPLE COULD HAVE NOTICED. OR THE OPPOSITE, BEING SO FIGETY OR RESTLESS THAT YOU HAVE BEEN MOVING AROUND A LOT MORE THAN USUAL: NOT AT ALL
2. FEELING DOWN, DEPRESSED, IRRITABLE, OR HOPELESS: NOT AT ALL
9. THOUGHTS THAT YOU WOULD BE BETTER OFF DEAD, OR OF HURTING YOURSELF: NOT AT ALL
SUM OF ALL RESPONSES TO PHQ QUESTIONS 1-9: 0

## 2019-02-21 NOTE — PROGRESS NOTES
Subjective:   Beatriz Crespo is a 59 y.o. female here today for   Chief Complaint   Patient presents with   • Annual Exam   • Gynecologic Exam       1. Well woman exam with routine gynecological exam  2. Screening for malignant neoplasm of cervix  3. Screening for HPV (human papillomavirus)  Patient is here today for a Pap smear.  This was last done in 2015.  She is monogamous with her  but is very rarely sexually active.  She does complain of hemorrhoids and anal itching.  She is no longer having periods.  She is not exercising.  She did not receive influenza vaccine.    4. Elevated liver enzymes  This is a chronic problem.  Patient does have elevated LFTs and also is drinking alcohol in excess.  She denies any withdrawal symptoms.  She denies any increased abdominal circumference, jaundice, lower extremity edema, nausea, vomiting, hematemesis, melena.  She did decrease her alcohol intake over the summer when she was more active.  She has had negative hepatitis testing.    5. Prediabetes  This is chronic.  She is drinking excessive alcohol and is also not eating well.  She is not exercising.  Her hemoglobin A1c is 5.7%.  We reviewed her labs today.  No polyuria, polydipsia, neuropathy.    6. Mixed hyperlipidemia  This is chronic.  Patient has been on atorvastatin and pitavastatin in the past with myalgias and rash.  She also tried Zetia.  She had seen Dr. Michael Bloch in the past.  She is not interested in any further treatment although we did discuss injectables today.  She states she has a long family history of dyslipidemia.    7. Hemorrhoids, unspecified hemorrhoid type  This is a new problem to discuss.  She describes anal pruritus.  She has used cream for this in the past.  She is keeping her stool soft.  She does describe some protruding hemorrhoids.  No GI bleeding.        Current medicines (including changes today)  Current Outpatient Prescriptions   Medication Sig Dispense Refill   •  "omeprazole (PRILOSEC) 20 MG delayed-release capsule Take 20 mg by mouth every day.     • diphenhydrAMINE (BENADRYL) 25 MG Tab Take 1 tablet by mouth every 6 hours as needed for Itching. 30 Tab 0     No current facility-administered medications for this visit.      She  has a past medical history of Dyspnea (3/22/2012); GERD (gastroesophageal reflux disease) (3/22/2012); Hyperlipidemia (8/3/2011); Insomnia (12/5/2014); Mixed hyperlipidemia (7/27/2015); and Solar keratosis (8/3/2011). She also has no past medical history of Breast cancer (HCC).    ROS   Constitutional ROS: No unexpected change in weight, No unexplained fevers, sweats, or chills  Eye ROS: No recent significant change in vision  Ear ROS: No ear pain, No recent change in hearing  Mouth/Throat ROS: No bleeding gums, No sore throat  Neck ROS: No lumps or masses, No swollen glands  Pulmonary ROS: No chronic cough, sputum, or hemoptysis  Cardiovascular ROS: No chest pain, No shortness of breath, No edema, No palpitations  Gastrointestinal ROS: No abdominal pain, No change in bowel habits, No significant change in appetite  Breast ROS: No new breast lumps or masses, No nipple discharge  Musculoskeletal/Extremities ROS: No peripheral edema, No pain, redness or swelling on the joints  Hematologic/Lymphatic ROS: No abnormal bleeding, No bruising, No weight loss  Skin/Integumentary ROS: Positive for dryness, rash- generalized   Neurologic ROS: No chronic headaches  Psychiatric ROS: Positive for alcohol abuse       Objective:     Blood pressure 110/74, pulse 87, temperature 36.2 °C (97.1 °F), temperature source Temporal, resp. rate 14, height 1.727 m (5' 8\"), weight 80.7 kg (178 lb), last menstrual period 11/21/2012, SpO2 95 %, not currently breastfeeding. Body mass index is 27.06 kg/m².   Physical Exam:  Constitutional: Alert, no distress.  Skin: Warm, dry, good turgor, multiple skin colored stuck on lesions on the arms and legs  Eye: Equal, round and reactive, " conjunctiva clear, lids normal.  ENMT: Lips without lesions, good dentition, oropharynx clear.  Neck: Trachea midline, no masses, no thyromegaly. No cervical or supraclavicular lymphadenopathy  Respiratory: Unlabored respiratory effort, lungs clear to auscultation, no wheezes, no ronchi.  Cardiovascular: Normal S1, S2, RRR, no murmur, no edema.  Psych: Alert and oriented x3, normal affect and mood.  ABD: Abdomen soft, ND, NT. No CVAT. No suprapubic tenderness.  PELVIC:Perineum and external genitalia normal without rash. Vagina with normal and physiologic discharge. Cervix without visible lesions or discharge. Bimanual exam without adnexal masses or cervical motion tenderness.  BREAST EXAM: No skin changes, peau d'orange or nipple retraction. No discharge. No axillary or supraclavicular adenopathy. No masses or nodularity palpable.         Assessment and Plan:   The following treatment plan was discussed    1. Well woman exam with routine gynecological exam  2. Screening for malignant neoplasm of cervix  3. Screening for HPV (human papillomavirus)  Age-appropriate counseling given, Pap smear with breast exam done today, mammogram reviewed.  Labs reviewed with the patient today and recommendations based on those given.  Discussed diet, exercise, alcohol cessation  - THINPREP PAP WITH HPV; Future    4. Elevated liver enzymes  Chronic, worsened.  Likely due to alcohol consumption.  Importance of cessation discussed.  Has not tried AA.  She has never filled medications that were prescribed for cravings.  She has not gone through withdrawals or had any seizures.  The importance for her long-term health discussed and discussed risks of liver failure and death.  Patient is going to try to quit but is not interested in any medical interventions.  - Comp Metabolic Panel; Future  - GAMMA GT (GGT); Future  - HEMOGLOBIN A1C; Future    5. Prediabetes  Chronic, stable with hemoglobin A1c of 5.7%.  Dietary changes and alcohol  cessation recommended  - Comp Metabolic Panel; Future  - GAMMA GT (GGT); Future  - HEMOGLOBIN A1C; Future    6. Mixed hyperlipidemia  Chronic, unstable.  Likely familial hyperlipidemia.  Has failed pitivastatin and atorvastatin as well as that he due to myalgias and rash.  She is not interested in any other medication management although we did discuss injectables today.  We also discussed doing coronary calcium score but she declines at this time.    7. Hemorrhoids, unspecified hemorrhoid type  This is a new problem, uncontrolled.  Discussed continuing to keep the stool soft.  Using witch hazel after every bowel movement.  Preparation H if needed.  Hydrocortisone cream for severe flares.      Followup: Return in about 3 months (around 5/21/2019), or if symptoms worsen or fail to improve, for LFTs.       This note was created using voice recognition software. I have made every reasonable attempt to correct errors, however, I do anticipate some grammatical errors.

## 2019-02-22 DIAGNOSIS — Z11.51 SCREENING FOR HPV (HUMAN PAPILLOMAVIRUS): ICD-10-CM

## 2019-02-22 DIAGNOSIS — Z12.4 SCREENING FOR MALIGNANT NEOPLASM OF CERVIX: ICD-10-CM

## 2019-02-22 DIAGNOSIS — Z01.419 WELL WOMAN EXAM WITH ROUTINE GYNECOLOGICAL EXAM: ICD-10-CM

## 2019-02-22 LAB
CYTOLOGY REG CYTOL: NORMAL
HPV HR 12 DNA CVX QL NAA+PROBE: NEGATIVE
HPV16 DNA SPEC QL NAA+PROBE: NEGATIVE
HPV18 DNA SPEC QL NAA+PROBE: NEGATIVE
SPECIMEN SOURCE: NORMAL

## 2019-07-07 ENCOUNTER — APPOINTMENT (OUTPATIENT)
Dept: RADIOLOGY | Facility: MEDICAL CENTER | Age: 59
End: 2019-07-07
Attending: EMERGENCY MEDICINE
Payer: COMMERCIAL

## 2019-07-07 ENCOUNTER — HOSPITAL ENCOUNTER (EMERGENCY)
Facility: MEDICAL CENTER | Age: 59
End: 2019-07-07
Attending: EMERGENCY MEDICINE
Payer: COMMERCIAL

## 2019-07-07 VITALS
HEART RATE: 74 BPM | OXYGEN SATURATION: 94 % | WEIGHT: 180 LBS | TEMPERATURE: 98.1 F | BODY MASS INDEX: 27.28 KG/M2 | RESPIRATION RATE: 23 BRPM | HEIGHT: 68 IN | DIASTOLIC BLOOD PRESSURE: 78 MMHG | SYSTOLIC BLOOD PRESSURE: 122 MMHG

## 2019-07-07 DIAGNOSIS — F41.9 ANXIETY: ICD-10-CM

## 2019-07-07 DIAGNOSIS — R42 DIZZINESS: ICD-10-CM

## 2019-07-07 LAB
ALBUMIN SERPL BCP-MCNC: 3.9 G/DL (ref 3.2–4.9)
ALBUMIN/GLOB SERPL: 1.5 G/DL
ALP SERPL-CCNC: 49 U/L (ref 30–99)
ALT SERPL-CCNC: 109 U/L (ref 2–50)
ANION GAP SERPL CALC-SCNC: 9 MMOL/L (ref 0–11.9)
APPEARANCE UR: CLEAR
AST SERPL-CCNC: 67 U/L (ref 12–45)
BASOPHILS # BLD AUTO: 0.4 % (ref 0–1.8)
BASOPHILS # BLD: 0.03 K/UL (ref 0–0.12)
BILIRUB SERPL-MCNC: 1 MG/DL (ref 0.1–1.5)
BILIRUB UR QL STRIP.AUTO: NEGATIVE
BUN SERPL-MCNC: 12 MG/DL (ref 8–22)
CALCIUM SERPL-MCNC: 9.1 MG/DL (ref 8.5–10.5)
CHLORIDE SERPL-SCNC: 108 MMOL/L (ref 96–112)
CO2 SERPL-SCNC: 24 MMOL/L (ref 20–33)
COLOR UR: YELLOW
CREAT SERPL-MCNC: 0.86 MG/DL (ref 0.5–1.4)
EKG IMPRESSION: NORMAL
EOSINOPHIL # BLD AUTO: 0 K/UL (ref 0–0.51)
EOSINOPHIL NFR BLD: 0 % (ref 0–6.9)
ERYTHROCYTE [DISTWIDTH] IN BLOOD BY AUTOMATED COUNT: 39.3 FL (ref 35.9–50)
ETHANOL BLD-MCNC: 0 G/DL
GLOBULIN SER CALC-MCNC: 2.6 G/DL (ref 1.9–3.5)
GLUCOSE SERPL-MCNC: 116 MG/DL (ref 65–99)
GLUCOSE UR STRIP.AUTO-MCNC: NEGATIVE MG/DL
HCT VFR BLD AUTO: 42.3 % (ref 37–47)
HGB BLD-MCNC: 14.6 G/DL (ref 12–16)
IMM GRANULOCYTES # BLD AUTO: 0.05 K/UL (ref 0–0.11)
IMM GRANULOCYTES NFR BLD AUTO: 0.6 % (ref 0–0.9)
KETONES UR STRIP.AUTO-MCNC: 40 MG/DL
LEUKOCYTE ESTERASE UR QL STRIP.AUTO: NEGATIVE
LYMPHOCYTES # BLD AUTO: 1.35 K/UL (ref 1–4.8)
LYMPHOCYTES NFR BLD: 16.9 % (ref 22–41)
MCH RBC QN AUTO: 32.7 PG (ref 27–33)
MCHC RBC AUTO-ENTMCNC: 34.5 G/DL (ref 33.6–35)
MCV RBC AUTO: 94.6 FL (ref 81.4–97.8)
MICRO URNS: ABNORMAL
MONOCYTES # BLD AUTO: 0.63 K/UL (ref 0–0.85)
MONOCYTES NFR BLD AUTO: 7.9 % (ref 0–13.4)
NEUTROPHILS # BLD AUTO: 5.95 K/UL (ref 2–7.15)
NEUTROPHILS NFR BLD: 74.2 % (ref 44–72)
NITRITE UR QL STRIP.AUTO: NEGATIVE
NRBC # BLD AUTO: 0 K/UL
NRBC BLD-RTO: 0 /100 WBC
PH UR STRIP.AUTO: 6.5 [PH]
PLATELET # BLD AUTO: 215 K/UL (ref 164–446)
PMV BLD AUTO: 10.1 FL (ref 9–12.9)
POTASSIUM SERPL-SCNC: 4.6 MMOL/L (ref 3.6–5.5)
PROT SERPL-MCNC: 6.5 G/DL (ref 6–8.2)
PROT UR QL STRIP: NEGATIVE MG/DL
RBC # BLD AUTO: 4.47 M/UL (ref 4.2–5.4)
RBC UR QL AUTO: NEGATIVE
SODIUM SERPL-SCNC: 141 MMOL/L (ref 135–145)
SP GR UR STRIP.AUTO: <=1.005
TROPONIN I SERPL-MCNC: <0.01 NG/ML (ref 0–0.04)
UROBILINOGEN UR STRIP.AUTO-MCNC: 0.2 MG/DL
WBC # BLD AUTO: 8 K/UL (ref 4.8–10.8)

## 2019-07-07 PROCEDURE — 99285 EMERGENCY DEPT VISIT HI MDM: CPT

## 2019-07-07 PROCEDURE — 85025 COMPLETE CBC W/AUTO DIFF WBC: CPT

## 2019-07-07 PROCEDURE — 93005 ELECTROCARDIOGRAM TRACING: CPT | Performed by: EMERGENCY MEDICINE

## 2019-07-07 PROCEDURE — 80307 DRUG TEST PRSMV CHEM ANLYZR: CPT

## 2019-07-07 PROCEDURE — 81003 URINALYSIS AUTO W/O SCOPE: CPT

## 2019-07-07 PROCEDURE — 80053 COMPREHEN METABOLIC PANEL: CPT

## 2019-07-07 PROCEDURE — 84484 ASSAY OF TROPONIN QUANT: CPT

## 2019-07-07 PROCEDURE — 71045 X-RAY EXAM CHEST 1 VIEW: CPT

## 2019-07-07 PROCEDURE — 96374 THER/PROPH/DIAG INJ IV PUSH: CPT

## 2019-07-07 PROCEDURE — 96375 TX/PRO/DX INJ NEW DRUG ADDON: CPT

## 2019-07-07 PROCEDURE — 36415 COLL VENOUS BLD VENIPUNCTURE: CPT

## 2019-07-07 PROCEDURE — 70450 CT HEAD/BRAIN W/O DYE: CPT

## 2019-07-07 PROCEDURE — 700111 HCHG RX REV CODE 636 W/ 250 OVERRIDE (IP): Performed by: EMERGENCY MEDICINE

## 2019-07-07 PROCEDURE — 70551 MRI BRAIN STEM W/O DYE: CPT

## 2019-07-07 RX ORDER — HALOPERIDOL 5 MG/ML
3 INJECTION INTRAMUSCULAR ONCE
Status: COMPLETED | OUTPATIENT
Start: 2019-07-07 | End: 2019-07-07

## 2019-07-07 RX ORDER — LORAZEPAM 2 MG/ML
1 INJECTION INTRAMUSCULAR ONCE
Status: DISCONTINUED | OUTPATIENT
Start: 2019-07-07 | End: 2019-07-07 | Stop reason: HOSPADM

## 2019-07-07 RX ORDER — LORAZEPAM 2 MG/ML
1 INJECTION INTRAMUSCULAR ONCE
Status: COMPLETED | OUTPATIENT
Start: 2019-07-07 | End: 2019-07-07

## 2019-07-07 RX ORDER — MECLIZINE HYDROCHLORIDE 25 MG/1
25 TABLET ORAL 3 TIMES DAILY PRN
Qty: 30 TAB | Refills: 0 | Status: SHIPPED | OUTPATIENT
Start: 2019-07-07 | End: 2020-02-27

## 2019-07-07 RX ADMIN — LORAZEPAM 1 MG: 2 INJECTION INTRAMUSCULAR; INTRAVENOUS at 13:35

## 2019-07-07 RX ADMIN — HALOPERIDOL LACTATE 3 MG: 5 INJECTION, SOLUTION INTRAMUSCULAR at 13:35

## 2019-07-07 NOTE — ED PROVIDER NOTES
ED PROVIDER NOTE    Scribed for Kendall Roberson M.D. by Cherelle Ramos. 7/7/2019, 4:56 PM.    This is an addendum to the note on Beatriz Crespo. For further details and full chart entry, see the previously signed ED Provider Note written by Dr. Erik Arias (ERP).      4:00 PM - I discussed the patient's case with Dr. Arias (ERP) who will transfer care of the patient to me at this time.        4:55 PM - Reviewed MRI result.     4:57 PM - The patient's continuing management included follow up on MRI results for patient's new onset dizziness. At the time of sign out, patient is not acutely dizzy and ambulating with a steady gait. MRI shows white matter changes consistent with microvascular disease which is common for patient's age. No acute infarction or hemorrhage or lesions. Appears unchanged from 9/7/17.     5:08 PM - Patient was reevaluated at bedside. Discussed radiology result with the patient and informed her of discharge plan as outlined below. She verbalizes her understanding and agrees to discharge.      FINAL IMPRESSION   Dizziness of unknown etiology     Cherelle RHODES ([place]), am scribing for, and in the presence of, Kendall Roberson M.D..    Electronically signed by: Cherelle Ramos ([place]), 7/7/2019    Kendall RHODES M.D. personally performed the services described in this documentation, as scribed by Cherelle Ramos in my presence, and it is both accurate and complete.    The note accurately reflects work and decisions made by me.  Kendall Roberson  7/8/2019  1:01 AM

## 2019-07-07 NOTE — ED TRIAGE NOTES
Chief Complaint   Patient presents with   • Dizziness     BIB EMS, per EMS pt has hx of anxiety and has various complaints including dizziness, CP, arm and leg tingling, and diarrhea for 2 days. per EMS she required multiple efforts for calming pt en route. VSS.    • Anxiety

## 2019-08-20 DIAGNOSIS — Z12.11 SCREENING FOR COLON CANCER: ICD-10-CM

## 2019-11-01 DIAGNOSIS — R74.8 ELEVATED LIVER ENZYMES: ICD-10-CM

## 2019-11-01 DIAGNOSIS — E78.2 MIXED HYPERLIPIDEMIA: ICD-10-CM

## 2019-11-01 DIAGNOSIS — R73.03 PREDIABETES: ICD-10-CM

## 2019-11-13 ENCOUNTER — TELEPHONE (OUTPATIENT)
Dept: MEDICAL GROUP | Facility: LAB | Age: 59
End: 2019-11-13

## 2019-11-13 DIAGNOSIS — Z01.00 EYE EXAM, ROUTINE: ICD-10-CM

## 2019-11-13 NOTE — TELEPHONE ENCOUNTER
----- Message from Beatriz Crespo sent at 11/13/2019 10:26 AM PST -----  Regarding: Non-Urgent Medical Question  Contact: [phone]  Referal:  I'm not getting a drop down box under referral.  I need a referral to get an eye exam, please.    Beatriz Crespo

## 2020-01-13 ENCOUNTER — TELEPHONE (OUTPATIENT)
Dept: MEDICAL GROUP | Facility: LAB | Age: 60
End: 2020-01-13

## 2020-01-13 NOTE — TELEPHONE ENCOUNTER
"----- Message from Beatriz Crespo sent at 1/13/2020  1:49 PM PST -----  Regarding: RE: Non-Urgent Medical Question  Contact: [phone]  The ENT is because my right ear is clogged and my hearing is impaired.  I do need an eye exam, not surgery.  ----- Message -----  From: Harleen Monson Med Ass't  Sent: 1/13/2020  1:46 PM PST  To: Beatriz Crespo  Subject: RE: Non-Urgent Medical Question  The specialist require a reason. What is the reason you need to see ENT? I can not submit without this information. Is the eye doctor for a routine eye exam or do you have a specific medical reason?      ----- Message -----     From: Beatriz Crespo     Sent: 1/13/2020  1:16 PM PST       To: Harleen Monson, Med Ass't  Subject: RE: Non-Urgent Medical Question    What does it man when you way \"we will need a diagnosis for each referral\"?  If that means I need to see a doctor about my problem then I guess I have to wait until Feb 28, 7am, my next appointment.  ENT would be ENT SPECIALIST - [phone]  ----- Message -----  From: Harleen Monson, Med Ass't  Sent: 1/6/2020  7:35 AM PST  To: Beatriz Crespo  Subject: RE: Non-Urgent Medical Question  Do you have a specific specialist that you would like to see for ENT and what is this for? Also who did you want to see for your eyes? If this is for optometry you do not need a referral. You need a referral for an ophthalmologist and we will need a diagnosis for each referral.      ----- Message -----     From: Beatriz Crespo     Sent: 1/6/2020  7:18 AM PST       To: VIMAL Baltazar  Subject: RE: Non-Urgent Medical Question    I need a referral to have my eyes checked and to see an ENT.  Also, I have an appointment coming up and would like to have a lab test that checks my pancreas.    For some reason my lester appointment was canceled by them.  I need to check on that situation.    Thanks,  Beatriz Crespo      "

## 2020-02-18 ENCOUNTER — HOSPITAL ENCOUNTER (OUTPATIENT)
Dept: RADIOLOGY | Facility: MEDICAL CENTER | Age: 60
End: 2020-02-18
Attending: FAMILY MEDICINE
Payer: COMMERCIAL

## 2020-02-18 DIAGNOSIS — Z12.31 VISIT FOR SCREENING MAMMOGRAM: ICD-10-CM

## 2020-02-18 PROCEDURE — 77067 SCR MAMMO BI INCL CAD: CPT

## 2020-02-25 LAB
ALBUMIN SERPL-MCNC: 4.4 G/DL (ref 3.8–4.9)
ALBUMIN/GLOB SERPL: 1.8 {RATIO} (ref 1.2–2.2)
ALP SERPL-CCNC: 64 IU/L (ref 39–117)
ALT SERPL-CCNC: 27 IU/L (ref 0–32)
AST SERPL-CCNC: 25 IU/L (ref 0–40)
BASOPHILS # BLD AUTO: 0 X10E3/UL (ref 0–0.2)
BASOPHILS NFR BLD AUTO: 0 %
BILIRUB SERPL-MCNC: 0.4 MG/DL (ref 0–1.2)
BUN SERPL-MCNC: 15 MG/DL (ref 6–24)
BUN/CREAT SERPL: 16 (ref 9–23)
CALCIUM SERPL-MCNC: 9.6 MG/DL (ref 8.7–10.2)
CHLORIDE SERPL-SCNC: 101 MMOL/L (ref 96–106)
CHOLEST SERPL-MCNC: 328 MG/DL (ref 100–199)
CO2 SERPL-SCNC: 22 MMOL/L (ref 20–29)
CREAT SERPL-MCNC: 0.93 MG/DL (ref 0.57–1)
EOSINOPHIL # BLD AUTO: 0.1 X10E3/UL (ref 0–0.4)
EOSINOPHIL NFR BLD AUTO: 1 %
ERYTHROCYTE [DISTWIDTH] IN BLOOD BY AUTOMATED COUNT: 12.9 % (ref 11.7–15.4)
GLOBULIN SER CALC-MCNC: 2.5 G/DL (ref 1.5–4.5)
GLUCOSE SERPL-MCNC: 120 MG/DL (ref 65–99)
HBA1C MFR BLD: 5.5 % (ref 4.8–5.6)
HCT VFR BLD AUTO: 45.6 % (ref 34–46.6)
HDLC SERPL-MCNC: 73 MG/DL
HGB BLD-MCNC: 15.2 G/DL (ref 11.1–15.9)
IMM GRANULOCYTES # BLD AUTO: 0 X10E3/UL (ref 0–0.1)
IMM GRANULOCYTES NFR BLD AUTO: 0 %
IMMATURE CELLS  115398: NORMAL
LABORATORY COMMENT REPORT: ABNORMAL
LDLC SERPL CALC-MCNC: 211 MG/DL (ref 0–99)
LYMPHOCYTES # BLD AUTO: 2.1 X10E3/UL (ref 0.7–3.1)
LYMPHOCYTES NFR BLD AUTO: 37 %
MCH RBC QN AUTO: 31.5 PG (ref 26.6–33)
MCHC RBC AUTO-ENTMCNC: 33.3 G/DL (ref 31.5–35.7)
MCV RBC AUTO: 95 FL (ref 79–97)
MONOCYTES # BLD AUTO: 0.4 X10E3/UL (ref 0.1–0.9)
MONOCYTES NFR BLD AUTO: 7 %
MORPHOLOGY BLD-IMP: NORMAL
NEUTROPHILS # BLD AUTO: 3 X10E3/UL (ref 1.4–7)
NEUTROPHILS NFR BLD AUTO: 55 %
NRBC BLD AUTO-RTO: NORMAL %
PLATELET # BLD AUTO: 275 X10E3/UL (ref 150–450)
POTASSIUM SERPL-SCNC: 4.3 MMOL/L (ref 3.5–5.2)
PROT SERPL-MCNC: 6.9 G/DL (ref 6–8.5)
RBC # BLD AUTO: 4.82 X10E6/UL (ref 3.77–5.28)
SODIUM SERPL-SCNC: 139 MMOL/L (ref 134–144)
TRIGL SERPL-MCNC: 221 MG/DL (ref 0–149)
VLDLC SERPL CALC-MCNC: 44 MG/DL (ref 5–40)
WBC # BLD AUTO: 5.5 X10E3/UL (ref 3.4–10.8)

## 2020-02-27 ENCOUNTER — OFFICE VISIT (OUTPATIENT)
Dept: MEDICAL GROUP | Facility: LAB | Age: 60
End: 2020-02-27
Payer: COMMERCIAL

## 2020-02-27 VITALS
BODY MASS INDEX: 24.55 KG/M2 | DIASTOLIC BLOOD PRESSURE: 76 MMHG | RESPIRATION RATE: 12 BRPM | HEIGHT: 68 IN | SYSTOLIC BLOOD PRESSURE: 104 MMHG | OXYGEN SATURATION: 95 % | HEART RATE: 82 BPM | TEMPERATURE: 98.2 F | WEIGHT: 162 LBS

## 2020-02-27 DIAGNOSIS — Z23 NEED FOR SHINGLES VACCINE: ICD-10-CM

## 2020-02-27 DIAGNOSIS — Z00.00 WELL ADULT EXAM: ICD-10-CM

## 2020-02-27 DIAGNOSIS — F10.11 HISTORY OF ALCOHOL ABUSE: ICD-10-CM

## 2020-02-27 DIAGNOSIS — F51.01 PRIMARY INSOMNIA: ICD-10-CM

## 2020-02-27 DIAGNOSIS — E78.49 OTHER HYPERLIPIDEMIA: ICD-10-CM

## 2020-02-27 PROBLEM — R73.03 PREDIABETES: Status: RESOLVED | Noted: 2018-01-18 | Resolved: 2020-02-27

## 2020-02-27 PROBLEM — R10.2 ACUTE PELVIC PAIN: Status: RESOLVED | Noted: 2018-11-11 | Resolved: 2020-02-27

## 2020-02-27 PROCEDURE — 90471 IMMUNIZATION ADMIN: CPT | Performed by: NURSE PRACTITIONER

## 2020-02-27 PROCEDURE — 90750 HZV VACC RECOMBINANT IM: CPT | Performed by: NURSE PRACTITIONER

## 2020-02-27 PROCEDURE — 99396 PREV VISIT EST AGE 40-64: CPT | Mod: 25 | Performed by: NURSE PRACTITIONER

## 2020-02-27 RX ORDER — ALPRAZOLAM 1 MG/1
TABLET ORAL
Qty: 30 TAB | Refills: 2 | Status: SHIPPED
Start: 2020-02-27 | End: 2020-05-13 | Stop reason: SDUPTHER

## 2020-02-27 NOTE — PROGRESS NOTES
Chief Complaint   Patient presents with   • Annual Exam       HPI:  Beatriz is a 60 y.o.female who presents for annual exam. Gyn care UTD.  Works in administration.    Regarding her health maintenance:   Last pap: UTD and normal hx per pt  Last Mammo:UTD  Last colonoscopy: UTD  Bone density test:N\A   Last Lab: UTD  Last Td:current   Influenza vaccination:declines  Pneumococcal vaccination:not applicable   shingrix : desires today  Hx STD''s: no   Regular exercise: yes  Can't tolerate statins or zetia ( became a problem).   Cut out alcohol and lowered pasta intake, restarted exercise x the past year.  Tells me that she cared for a very sick dog until last December, this passed away and she has cleaned up her lifestyle.  Component      Latest Ref Rng & Units 1/16/2018 11/8/2018 2/15/2019 2/24/2020           7:43 AM  3:18 PM 11:44 AM  4:02 AM   Glycohemoglobin      4.8 - 5.6 % 5.9 (H) 5.6 5.7 (H) 5.5   Estim. Avg Glu      mg/dL 123        Component      Latest Ref Rng & Units 1/16/2018 11/8/2018 2/15/2019 2/24/2020           7:43 AM  3:18 PM 11:44 AM  4:02 AM   Cholesterol,Tot      100 - 199 mg/dL 269 (H) 312 (H) 328 (H) 328 (H)   Triglycerides      0 - 149 mg/dL 284 (H) 186 (H) 255 (H) 221 (H)   HDL      >39 mg/dL 57 59 57 73   VLDL Cholesterol Calc      5 - 40 mg/dL  37 51 (H) 44 (H)   LDL      0 - 99 mg/dL 155 (H) 216 (H) 220 (H) 211 (H)     meds:   Current Outpatient Medications   Medication Sig Dispense Refill   • meclizine (ANTIVERT) 25 MG Tab Take 1 Tab by mouth 3 times a day as needed for Dizziness or Vertigo. 30 Tab 0   • omeprazole (PRILOSEC) 20 MG delayed-release capsule Take 20 mg by mouth every day.       No current facility-administered medications for this visit.        Allergies: No Known Allergies    family:   Family History   Problem Relation Age of Onset   • Hypertension Mother    • Hyperlipidemia Mother    • Diabetes Mother         Dm type 1   • Hypertension Father    • Hyperlipidemia Father  "   • Stroke Father 78   • Cancer Paternal Grandmother         Breast cancer mid 70s   • Hyperlipidemia Sister        social hx:   Social History     Socioeconomic History   • Marital status:      Spouse name: Not on file   • Number of children: 1   • Years of education: Not on file   • Highest education level: Not on file   Occupational History   • Occupation:      Employer: [employer]   Social Needs   • Financial resource strain: Not on file   • Food insecurity     Worry: Not on file     Inability: Not on file   • Transportation needs     Medical: Not on file     Non-medical: Not on file   Tobacco Use   • Smoking status: Former Smoker     Packs/day: 0.02     Years: 1.00     Pack years: 0.02     Types: Cigarettes     Last attempt to quit: 2/20/2017     Years since quitting: 3.0   • Smokeless tobacco: Never Used   Substance and Sexual Activity   • Alcohol use:  Quit     Comment: occasional   • Drug use: No   • Sexual activity: Not Currently         ROS:  No fever, chills, sweats.   No polydipsia, polyuria, temperature intolerance, significant weight changes   No visual changes, blurred vision.  No chest pain, palpitations, peripheral swelling   No chronic cough, shortness of breath, dyspnea with exertion.   No dysphagia, odynophagia, black or bloody stools.   No abdominal pain, nausea, persistent diarrhea, constipation   No dysuria, hematuria, incontinence. Denies nocturia  No rash, pruritis, pigment changes.   No focal weakness, syncope, headache, confusion, persistent numbness.   All other systems are reviewed and negative.    PHYSICAL EXAMINATION:  /76 (BP Location: Left arm, Patient Position: Sitting, BP Cuff Size: Adult)   Pulse 82   Temp 36.8 °C (98.2 °F)   Resp 12   Ht 1.727 m (5' 8\")   Wt 73.5 kg (162 lb)   SpO2 95%   General appearance:healthy, well developed, well nourished  Psych: alert, no distress, cooperative  Eyes: EOM's normal, pupils equal, round, reactive to " light  ENT: Ears: external ears normal to inspection and palpation, TM's clear, Nose/Sinuses: nose shows no deformity, asymmetry, or inflammation  Neck: no asymmetry, masses, or scars, no adenopathy, thyroid normal to palpation  Lungs:chest symmetric with normal A/P diameter, no chest deformities noted, normal respiratory rate and rhythm  Cardiovascular:regular rate and rhythm, S1 normal  Abdomen: umbilicus normal, no masses palpable, no organomegaly  Musculoskeletal:ROM of all joints is normal, no evidence of joint instability  Lymphatic: None significantly enlarged  Skin: no rash, no edema  Neuro: mental status intact, cranial nerves 2-12 intact      ASSESSMENT/PLAN:  1.annual physical exam: HCM:     Pap smear, colonoscopy and mammogram are up-to-date.  She was given Shingrix No. 1 today and will return in 6 months for Shingrix No. 2.  Discussed potential side effects of Shingrix.  She declined influenza vaccines.  We reviewed all of her lab work, including her improved A1c.  Discussed her cholesterol in depth, including likelihood that she has familial hyperlipidemia.  She declines CT cardiac scoring, consult with Dr. Bloch's office and consideration of Repatha.  She states that she would prefer to work very hard on diet, exercise and recheck her lipid panel in 6 months.  I discussed with her that she most likely has a genetic predisposition to high cholesterol and it is very important for her to have a CT cardiac scoring/consider seeing Dr. Bloch's office but she declines this.  She is willing to return in 6 months for reconsideration.  -I encouraged her to exercise daily, eat a plant-based diet, continue refraining from alcohol and cigarettes.  -Follow-up 6 months.

## 2020-05-13 DIAGNOSIS — F32.1 MODERATE SINGLE CURRENT EPISODE OF MAJOR DEPRESSIVE DISORDER (HCC): ICD-10-CM

## 2020-05-13 DIAGNOSIS — F51.01 PRIMARY INSOMNIA: ICD-10-CM

## 2020-05-13 RX ORDER — FLUTICASONE PROPIONATE 50 MCG
SPRAY, SUSPENSION (ML) NASAL
Qty: 48 G | Refills: 3 | Status: SHIPPED | OUTPATIENT
Start: 2020-05-13 | End: 2022-03-28

## 2020-05-13 RX ORDER — ALPRAZOLAM 1 MG/1
TABLET ORAL
Qty: 30 TAB | Refills: 2 | Status: SHIPPED | OUTPATIENT
Start: 2020-05-13 | End: 2020-11-24 | Stop reason: SDUPTHER

## 2020-05-13 NOTE — TELEPHONE ENCOUNTER
Received request via: Patient   4/2/20  Was the patient seen in the last year in this department? Yes  2/27/20  Does the patient have an active prescription (recently filled or refills available) for medication(s) requested? No

## 2020-05-13 NOTE — TELEPHONE ENCOUNTER
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    I believe I just called in my last prescription for alprazolam.  Must I make an appt. with you before you can give me refills?      Would you call into [facility] a prescption  Fluticasone Propionate.  I had this once before and it should show in my file.  The nose spray gives me quick relief with my nasal passages.     I've been doing my part 100% by working from home and wearing a mask while grocery shopping.  Been riding my bike, walking, and lifting weights.    Thank you for your time and stay well my friend.  We are all in this together.     Beatriz Crespo

## 2020-08-28 ENCOUNTER — PATIENT MESSAGE (OUTPATIENT)
Dept: MEDICAL GROUP | Facility: LAB | Age: 60
End: 2020-08-28

## 2020-08-28 DIAGNOSIS — R14.0 ABDOMINAL BLOATING: ICD-10-CM

## 2020-09-02 ENCOUNTER — HOSPITAL ENCOUNTER (OUTPATIENT)
Dept: LAB | Facility: MEDICAL CENTER | Age: 60
End: 2020-09-02
Attending: NURSE PRACTITIONER
Payer: COMMERCIAL

## 2020-09-02 DIAGNOSIS — E78.49 OTHER HYPERLIPIDEMIA: ICD-10-CM

## 2020-09-02 DIAGNOSIS — R14.0 ABDOMINAL BLOATING: ICD-10-CM

## 2020-09-02 LAB
ALBUMIN SERPL BCP-MCNC: 4.6 G/DL (ref 3.2–4.9)
ALBUMIN/GLOB SERPL: 1.6 G/DL
ALP SERPL-CCNC: 42 U/L (ref 30–99)
ALT SERPL-CCNC: 43 U/L (ref 2–50)
ANION GAP SERPL CALC-SCNC: 9 MMOL/L (ref 7–16)
AST SERPL-CCNC: 36 U/L (ref 12–45)
BASOPHILS # BLD AUTO: 0.7 % (ref 0–1.8)
BASOPHILS # BLD: 0.05 K/UL (ref 0–0.12)
BILIRUB SERPL-MCNC: 0.8 MG/DL (ref 0.1–1.5)
BUN SERPL-MCNC: 12 MG/DL (ref 8–22)
CALCIUM SERPL-MCNC: 9.8 MG/DL (ref 8.4–10.2)
CHLORIDE SERPL-SCNC: 101 MMOL/L (ref 96–112)
CHOLEST SERPL-MCNC: 268 MG/DL (ref 100–199)
CO2 SERPL-SCNC: 28 MMOL/L (ref 20–33)
CREAT SERPL-MCNC: 0.76 MG/DL (ref 0.5–1.4)
EOSINOPHIL # BLD AUTO: 0.11 K/UL (ref 0–0.51)
EOSINOPHIL NFR BLD: 1.6 % (ref 0–6.9)
ERYTHROCYTE [DISTWIDTH] IN BLOOD BY AUTOMATED COUNT: 38 FL (ref 35.9–50)
FASTING STATUS PATIENT QL REPORTED: NORMAL
GLOBULIN SER CALC-MCNC: 2.8 G/DL (ref 1.9–3.5)
GLUCOSE SERPL-MCNC: 99 MG/DL (ref 65–99)
HCT VFR BLD AUTO: 45.6 % (ref 37–47)
HDLC SERPL-MCNC: 60 MG/DL
HGB BLD-MCNC: 15.5 G/DL (ref 12–16)
IMM GRANULOCYTES # BLD AUTO: 0.01 K/UL (ref 0–0.11)
IMM GRANULOCYTES NFR BLD AUTO: 0.1 % (ref 0–0.9)
LDLC SERPL CALC-MCNC: 186 MG/DL
LYMPHOCYTES # BLD AUTO: 2.64 K/UL (ref 1–4.8)
LYMPHOCYTES NFR BLD: 39.5 % (ref 22–41)
MCH RBC QN AUTO: 32 PG (ref 27–33)
MCHC RBC AUTO-ENTMCNC: 34 G/DL (ref 33.6–35)
MCV RBC AUTO: 94 FL (ref 81.4–97.8)
MONOCYTES # BLD AUTO: 0.51 K/UL (ref 0–0.85)
MONOCYTES NFR BLD AUTO: 7.6 % (ref 0–13.4)
NEUTROPHILS # BLD AUTO: 3.37 K/UL (ref 2–7.15)
NEUTROPHILS NFR BLD: 50.5 % (ref 44–72)
NRBC # BLD AUTO: 0 K/UL
NRBC BLD-RTO: 0 /100 WBC
PLATELET # BLD AUTO: 260 K/UL (ref 164–446)
PMV BLD AUTO: 10.4 FL (ref 9–12.9)
POTASSIUM SERPL-SCNC: 4.7 MMOL/L (ref 3.6–5.5)
PROT SERPL-MCNC: 7.4 G/DL (ref 6–8.2)
RBC # BLD AUTO: 4.85 M/UL (ref 4.2–5.4)
SODIUM SERPL-SCNC: 138 MMOL/L (ref 135–145)
TRIGL SERPL-MCNC: 108 MG/DL (ref 0–149)
WBC # BLD AUTO: 6.7 K/UL (ref 4.8–10.8)

## 2020-09-02 PROCEDURE — 80053 COMPREHEN METABOLIC PANEL: CPT

## 2020-09-02 PROCEDURE — 80061 LIPID PANEL: CPT

## 2020-09-02 PROCEDURE — 36415 COLL VENOUS BLD VENIPUNCTURE: CPT

## 2020-09-02 PROCEDURE — 85025 COMPLETE CBC W/AUTO DIFF WBC: CPT

## 2020-09-09 ENCOUNTER — OFFICE VISIT (OUTPATIENT)
Dept: MEDICAL GROUP | Facility: LAB | Age: 60
End: 2020-09-09
Payer: COMMERCIAL

## 2020-09-09 VITALS
DIASTOLIC BLOOD PRESSURE: 64 MMHG | TEMPERATURE: 97.7 F | WEIGHT: 159 LBS | HEART RATE: 76 BPM | HEIGHT: 68 IN | BODY MASS INDEX: 24.1 KG/M2 | OXYGEN SATURATION: 95 % | RESPIRATION RATE: 16 BRPM | SYSTOLIC BLOOD PRESSURE: 100 MMHG

## 2020-09-09 DIAGNOSIS — Z12.11 SCREENING FOR MALIGNANT NEOPLASM OF COLON: ICD-10-CM

## 2020-09-09 DIAGNOSIS — E78.5 HYPERLIPIDEMIA, UNSPECIFIED HYPERLIPIDEMIA TYPE: ICD-10-CM

## 2020-09-09 DIAGNOSIS — R14.0 ABDOMINAL BLOATING: ICD-10-CM

## 2020-09-09 DIAGNOSIS — Z91.89 OTHER SPECIFIED PERSONAL RISK FACTORS, NOT ELSEWHERE CLASSIFIED: ICD-10-CM

## 2020-09-09 PROCEDURE — 99214 OFFICE O/P EST MOD 30 MIN: CPT | Performed by: NURSE PRACTITIONER

## 2020-09-09 ASSESSMENT — FIBROSIS 4 INDEX: FIB4 SCORE: 1.22

## 2020-09-09 NOTE — PROGRESS NOTES
"Chief Complaint   Patient presents with   • Bloated     X 3-4 weeks        HPI  Beatriz is a 60-year-old established female here with concerned over newly enlarged abdomen x 1 mo. states that she feels like she is pregnant and used to have a flat stomach.  She has not had any weight gain.  Feeling well - exercising a lot.  Feeling bloated.  Appetite is \"great but I am eating healthy.\"  Started drinking a lot in  - \"binging.\"  6.5 yrs of drinking - 3 shots - 1 bottle of wine.  Quit drinking completely 2 mo ago.   Denies diarrhea, n/v.  Has pain to right lateral abdomen - points to entire left side of abdomen.    Last colonoscopy  - repeat 10 yrs. denies hematochezia or bowel pattern changes.    Hyperlipidemia: Chronic issue.  Has seen vascular medicine in the past and was resistant to trying any other statins.  Tried atorvastatin, pravastatin and zetia.  Lost ability to  with statins.  Has not smoked in 20 yrs. Has not had a CT cardiac scoring.  Denies chest pain or trouble breathing.    Past medical, surgical, family, and social history is reviewed and updated in Epic chart by me today.   Medications and allergies reviewed and updated in Epic chart by me today.     ROS:   As documented in history of present illness above    Exam:  /64   Pulse 76   Temp 36.5 °C (97.7 °F)   Resp 16   Ht 1.727 m (5' 8\")   Wt 72.1 kg (159 lb)   SpO2 95%   Constitutional: Alert, no distress, plus 3 vital signs  Skin:  Warm, dry, no rashes invisible areas  Eye: Equal, round and reactive, conjunctiva clear  ENMT: Lips without lesions.  Respiratory: Unlabored respiration, lungs clear to auscultation, no wheezes, no rhonchi  Cardiovascular: Normal rate and rhythm, no murmur, no edema  Abdomen: Soft, rounded but nontender.  No masses felt.  No hernia appreciated.  Psych: Alert, pleasant, well-groomed, normal affect    Assessment / Plan / Medical Decision makin. Hyperlipidemia, unspecified hyperlipidemia " type  -Recommend CT cardiac scoring and a consult with vascular medicine to see if she can get approved for Repatha as she is very resistant to the idea of trying rosuvastatin or any other statin therapy.  - CT-CARDIAC SCORING; Future  - REFERRAL TO VASCULAR MEDICINE Reason for Referral? Lipids    2. Abdominal bloating  -Recommend complete abdominal ultrasound and a consult with GI.  Fortunately she is not in pain, nauseated, vomiting or having any problems with her bowels.  - US-ABDOMEN COMPLETE SURVEY; Future    3. Other specified personal risk factors, not elsewhere classified  - CT-CARDIAC SCORING; Future  - REFERRAL TO VASCULAR MEDICINE Reason for Referral? Lipids    4. Screening for malignant neoplasm of colon  - REFERRAL TO GASTROENTEROLOGY

## 2020-09-10 ENCOUNTER — TELEPHONE (OUTPATIENT)
Dept: MEDICAL GROUP | Facility: LAB | Age: 60
End: 2020-09-10

## 2020-09-10 ENCOUNTER — NON-PROVIDER VISIT (OUTPATIENT)
Dept: MEDICAL GROUP | Facility: LAB | Age: 60
End: 2020-09-10
Payer: COMMERCIAL

## 2020-09-10 DIAGNOSIS — Z23 NEED FOR VACCINATION: ICD-10-CM

## 2020-09-10 PROCEDURE — 90471 IMMUNIZATION ADMIN: CPT | Performed by: NURSE PRACTITIONER

## 2020-09-10 PROCEDURE — 90750 HZV VACC RECOMBINANT IM: CPT | Performed by: NURSE PRACTITIONER

## 2020-09-10 NOTE — PROGRESS NOTES
"GARRET Crespo is a 60 y.o. female here for a non-provider visit for:   SHINGRIX (Shingles)    Reason for immunization: Overdue/Provider Recommended  Immunization records indicate need for vaccine: Yes, confirmed with Epic  Minimum interval has been met for this vaccine: Yes  ABN completed: Not Indicated    Order and dose verified by: OLGA  VIS Dated  10/30/19 was given to patient: Yes  All IAC Questionnaire questions were answered \"No.\"    Patient tolerated injection and no adverse effects were observed or reported: Yes    Pt scheduled for next dose in series: Not Indicated  "

## 2020-10-19 ENCOUNTER — HOSPITAL ENCOUNTER (OUTPATIENT)
Dept: RADIOLOGY | Facility: MEDICAL CENTER | Age: 60
End: 2020-10-19
Attending: NURSE PRACTITIONER
Payer: COMMERCIAL

## 2020-10-19 DIAGNOSIS — R14.0 ABDOMINAL BLOATING: ICD-10-CM

## 2020-10-19 PROCEDURE — 76700 US EXAM ABDOM COMPLETE: CPT

## 2020-10-27 ENCOUNTER — PATIENT MESSAGE (OUTPATIENT)
Dept: MEDICAL GROUP | Facility: LAB | Age: 60
End: 2020-10-27

## 2020-10-27 DIAGNOSIS — R93.5 ABNORMAL ABDOMINAL ULTRASOUND: ICD-10-CM

## 2020-10-29 ENCOUNTER — APPOINTMENT (OUTPATIENT)
Dept: VASCULAR LAB | Facility: MEDICAL CENTER | Age: 60
End: 2020-10-29
Attending: FAMILY MEDICINE
Payer: COMMERCIAL

## 2020-11-13 ENCOUNTER — APPOINTMENT (OUTPATIENT)
Dept: RADIOLOGY | Facility: MEDICAL CENTER | Age: 60
End: 2020-11-13
Attending: NURSE PRACTITIONER
Payer: COMMERCIAL

## 2020-11-24 DIAGNOSIS — F51.01 PRIMARY INSOMNIA: ICD-10-CM

## 2020-11-24 RX ORDER — ALPRAZOLAM 1 MG/1
TABLET ORAL
Qty: 30 TAB | Refills: 2 | Status: SHIPPED | OUTPATIENT
Start: 2020-11-24 | End: 2021-04-05 | Stop reason: SDUPTHER

## 2020-11-24 NOTE — TELEPHONE ENCOUNTER
----- Message from Beatriz Crespo sent at 11/24/2020 11:11 AM PST -----  Regarding: Prescription Question  Contact: [phone]  Macie,    Valeria Thanksgiving!  I hope you and yours  are able to gather for a meal and give thanks.  Would you please call in to my pharmacy my prescription for Xanax.  Iwas taking only 1/2 of a 1 mg.  If they make them in 2 mg I could make them last longer by taking only 1/4.  I forgot to ask you about this at my office visit in February.    I recently  and am having difficulty with anxiety and lack of sleep;  I am free of the shackles of my oppressor.  Today is 107 days of sobriety for me!  YEAH!!  One day at a time.  Give thanks to the Lord, for he is good.  His love is everlasting.    Stay healthy,  Beatriz Crespo

## 2020-12-28 ENCOUNTER — PATIENT MESSAGE (OUTPATIENT)
Dept: MEDICAL GROUP | Facility: LAB | Age: 60
End: 2020-12-28

## 2020-12-28 DIAGNOSIS — Z11.59 SCREENING FOR VIRAL DISEASE: ICD-10-CM

## 2020-12-28 NOTE — TELEPHONE ENCOUNTER
----- Message from Beatriz Crespo sent at 12/28/2020  6:30 AM PST -----  Regarding: Procedure Question  Contact: [phone]  I've been exposed to someone who tested positive to COVID, last night at the hospital.  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  Does your facility do Rapid Covid testing, and if yes, can I get an appointment today.  I don't have any symptoms but we haven't been wearing masks at the house.    Also, I recently returned home from [facility] on [employer], December 20.  I wore a heavy-duty mask (Emeterio 1940, NIOSH R95, which fits securely around the face and includes a filter) on the plane and in the airports.    I work from home h of 5 days a week and the 1 day I go to work I'm alone.    Please advise and thank you,  Beatriz Crespo

## 2020-12-29 ENCOUNTER — HOSPITAL ENCOUNTER (OUTPATIENT)
Dept: LAB | Facility: MEDICAL CENTER | Age: 60
End: 2020-12-29
Attending: NURSE PRACTITIONER
Payer: COMMERCIAL

## 2020-12-29 DIAGNOSIS — Z11.59 SCREENING FOR VIRAL DISEASE: ICD-10-CM

## 2020-12-29 LAB
COVID ORDER STATUS COVID19: NORMAL
SARS-COV-2 RNA RESP QL NAA+PROBE: NOTDETECTED
SPECIMEN SOURCE: NORMAL

## 2020-12-29 PROCEDURE — U0003 INFECTIOUS AGENT DETECTION BY NUCLEIC ACID (DNA OR RNA); SEVERE ACUTE RESPIRATORY SYNDROME CORONAVIRUS 2 (SARS-COV-2) (CORONAVIRUS DISEASE [COVID-19]), AMPLIFIED PROBE TECHNIQUE, MAKING USE OF HIGH THROUGHPUT TECHNOLOGIES AS DESCRIBED BY CMS-2020-01-R: HCPCS

## 2020-12-29 PROCEDURE — C9803 HOPD COVID-19 SPEC COLLECT: HCPCS

## 2021-02-17 ENCOUNTER — PATIENT MESSAGE (OUTPATIENT)
Dept: MEDICAL GROUP | Facility: LAB | Age: 61
End: 2021-02-17

## 2021-02-17 DIAGNOSIS — E78.2 MIXED HYPERLIPIDEMIA: ICD-10-CM

## 2021-02-17 DIAGNOSIS — R73.01 IMPAIRED FASTING GLUCOSE: ICD-10-CM

## 2021-02-17 DIAGNOSIS — K75.81 STEATOHEPATITIS, NON-ALCOHOLIC: ICD-10-CM

## 2021-02-17 SDOH — ECONOMIC STABILITY: TRANSPORTATION INSECURITY
IN THE PAST 12 MONTHS, HAS THE LACK OF TRANSPORTATION KEPT YOU FROM MEDICAL APPOINTMENTS OR FROM GETTING MEDICATIONS?: NO

## 2021-02-17 SDOH — HEALTH STABILITY: PHYSICAL HEALTH: ON AVERAGE, HOW MANY MINUTES DO YOU ENGAGE IN EXERCISE AT THIS LEVEL?: 60 MINUTES

## 2021-02-17 SDOH — ECONOMIC STABILITY: INCOME INSECURITY: IN THE LAST 12 MONTHS, WAS THERE A TIME WHEN YOU WERE NOT ABLE TO PAY THE MORTGAGE OR RENT ON TIME?: NO

## 2021-02-17 SDOH — ECONOMIC STABILITY: HOUSING INSECURITY: IN THE LAST 12 MONTHS, HOW MANY PLACES HAVE YOU LIVED?: 1

## 2021-02-17 SDOH — ECONOMIC STABILITY: HOUSING INSECURITY
IN THE LAST 12 MONTHS, WAS THERE A TIME WHEN YOU DID NOT HAVE A STEADY PLACE TO SLEEP OR SLEPT IN A SHELTER (INCLUDING NOW)?: NO

## 2021-02-17 SDOH — HEALTH STABILITY: PHYSICAL HEALTH: ON AVERAGE, HOW MANY DAYS PER WEEK DO YOU ENGAGE IN MODERATE TO STRENUOUS EXERCISE (LIKE A BRISK WALK)?: 6 DAYS

## 2021-02-17 SDOH — ECONOMIC STABILITY: TRANSPORTATION INSECURITY
IN THE PAST 12 MONTHS, HAS LACK OF RELIABLE TRANSPORTATION KEPT YOU FROM MEDICAL APPOINTMENTS, MEETINGS, WORK OR FROM GETTING THINGS NEEDED FOR DAILY LIVING?: NO

## 2021-02-17 SDOH — HEALTH STABILITY: MENTAL HEALTH
STRESS IS WHEN SOMEONE FEELS TENSE, NERVOUS, ANXIOUS, OR CAN'T SLEEP AT NIGHT BECAUSE THEIR MIND IS TROUBLED. HOW STRESSED ARE YOU?: VERY MUCH

## 2021-02-17 ASSESSMENT — SOCIAL DETERMINANTS OF HEALTH (SDOH)
WITHIN THE PAST 12 MONTHS, THE FOOD YOU BOUGHT JUST DIDN'T LAST AND YOU DIDN'T HAVE MONEY TO GET MORE: NEVER TRUE
HOW OFTEN DO YOU HAVE SIX OR MORE DRINKS ON ONE OCCASION: NEVER
HOW OFTEN DO YOU HAVE A DRINK CONTAINING ALCOHOL: NEVER
HOW HARD IS IT FOR YOU TO PAY FOR THE VERY BASICS LIKE FOOD, HOUSING, MEDICAL CARE, AND HEATING?: NOT HARD AT ALL
HOW OFTEN DO YOU ATTENT MEETINGS OF THE CLUB OR ORGANIZATION YOU BELONG TO?: MORE THAN 4 TIMES PER YEAR
DO YOU BELONG TO ANY CLUBS OR ORGANIZATIONS SUCH AS CHURCH GROUPS UNIONS, FRATERNAL OR ATHLETIC GROUPS, OR SCHOOL GROUPS?: YES
WITHIN THE PAST 12 MONTHS, YOU WORRIED THAT YOUR FOOD WOULD RUN OUT BEFORE YOU GOT THE MONEY TO BUY MORE: NEVER TRUE
HOW MANY DRINKS CONTAINING ALCOHOL DO YOU HAVE ON A TYPICAL DAY WHEN YOU ARE DRINKING: PATIENT DECLINED
HOW OFTEN DO YOU GET TOGETHER WITH FRIENDS OR RELATIVES?: MORE THAN THREE TIMES A WEEK
IN A TYPICAL WEEK, HOW MANY TIMES DO YOU TALK ON THE PHONE WITH FAMILY, FRIENDS, OR NEIGHBORS?: TWICE A WEEK
HOW OFTEN DO YOU ATTEND CHURCH OR RELIGIOUS SERVICES?: MORE THAN 4 TIMES PER YEAR
ARE YOU MARRIED, WIDOWED, DIVORCED, SEPARATED, NEVER MARRIED, OR LIVING WITH A PARTNER?: DECLINE

## 2021-02-18 ENCOUNTER — APPOINTMENT (OUTPATIENT)
Dept: RADIOLOGY | Facility: MEDICAL CENTER | Age: 61
End: 2021-02-18
Attending: NURSE PRACTITIONER
Payer: COMMERCIAL

## 2021-02-20 ENCOUNTER — HOSPITAL ENCOUNTER (OUTPATIENT)
Dept: LAB | Facility: MEDICAL CENTER | Age: 61
End: 2021-02-20
Attending: NURSE PRACTITIONER
Payer: COMMERCIAL

## 2021-02-20 DIAGNOSIS — E78.2 MIXED HYPERLIPIDEMIA: ICD-10-CM

## 2021-02-20 DIAGNOSIS — R73.01 IMPAIRED FASTING GLUCOSE: ICD-10-CM

## 2021-02-20 DIAGNOSIS — K75.81 STEATOHEPATITIS, NON-ALCOHOLIC: ICD-10-CM

## 2021-02-20 LAB
ALBUMIN SERPL BCP-MCNC: 4.4 G/DL (ref 3.2–4.9)
ALBUMIN/GLOB SERPL: 1.6 G/DL
ALP SERPL-CCNC: 56 U/L (ref 30–99)
ALT SERPL-CCNC: 20 U/L (ref 2–50)
ANION GAP SERPL CALC-SCNC: 7 MMOL/L (ref 7–16)
AST SERPL-CCNC: 15 U/L (ref 12–45)
BILIRUB SERPL-MCNC: 0.2 MG/DL (ref 0.1–1.5)
BUN SERPL-MCNC: 12 MG/DL (ref 8–22)
CALCIUM SERPL-MCNC: 10.1 MG/DL (ref 8.5–10.5)
CHLORIDE SERPL-SCNC: 105 MMOL/L (ref 96–112)
CHOLEST SERPL-MCNC: 285 MG/DL (ref 100–199)
CO2 SERPL-SCNC: 28 MMOL/L (ref 20–33)
CREAT SERPL-MCNC: 0.78 MG/DL (ref 0.5–1.4)
FASTING STATUS PATIENT QL REPORTED: NORMAL
GLOBULIN SER CALC-MCNC: 2.8 G/DL (ref 1.9–3.5)
GLUCOSE SERPL-MCNC: 96 MG/DL (ref 65–99)
HDLC SERPL-MCNC: 63 MG/DL
LDLC SERPL CALC-MCNC: 198 MG/DL
POTASSIUM SERPL-SCNC: 4.6 MMOL/L (ref 3.6–5.5)
PROT SERPL-MCNC: 7.2 G/DL (ref 6–8.2)
SODIUM SERPL-SCNC: 140 MMOL/L (ref 135–145)
TRIGL SERPL-MCNC: 122 MG/DL (ref 0–149)

## 2021-02-20 PROCEDURE — 36415 COLL VENOUS BLD VENIPUNCTURE: CPT

## 2021-02-20 PROCEDURE — 80061 LIPID PANEL: CPT

## 2021-02-20 PROCEDURE — 80053 COMPREHEN METABOLIC PANEL: CPT

## 2021-02-20 PROCEDURE — 83036 HEMOGLOBIN GLYCOSYLATED A1C: CPT

## 2021-02-21 LAB
EST. AVERAGE GLUCOSE BLD GHB EST-MCNC: 117 MG/DL
HBA1C MFR BLD: 5.7 % (ref 0–5.6)

## 2021-02-24 ENCOUNTER — OFFICE VISIT (OUTPATIENT)
Dept: MEDICAL GROUP | Facility: LAB | Age: 61
End: 2021-02-24
Payer: COMMERCIAL

## 2021-02-24 VITALS
HEART RATE: 78 BPM | BODY MASS INDEX: 22.73 KG/M2 | DIASTOLIC BLOOD PRESSURE: 64 MMHG | WEIGHT: 150 LBS | HEIGHT: 68 IN | RESPIRATION RATE: 12 BRPM | OXYGEN SATURATION: 99 % | SYSTOLIC BLOOD PRESSURE: 98 MMHG | TEMPERATURE: 96.8 F

## 2021-02-24 DIAGNOSIS — Z00.00 WELL ADULT EXAM: ICD-10-CM

## 2021-02-24 DIAGNOSIS — F51.01 PRIMARY INSOMNIA: ICD-10-CM

## 2021-02-24 PROCEDURE — 99396 PREV VISIT EST AGE 40-64: CPT | Performed by: NURSE PRACTITIONER

## 2021-02-24 RX ORDER — TRAZODONE HYDROCHLORIDE 100 MG/1
TABLET ORAL
Qty: 30 TABLET | Refills: 1 | Status: SHIPPED | OUTPATIENT
Start: 2021-02-24 | End: 2021-02-25

## 2021-02-24 ASSESSMENT — PATIENT HEALTH QUESTIONNAIRE - PHQ9
8. MOVING OR SPEAKING SO SLOWLY THAT OTHER PEOPLE COULD HAVE NOTICED. OR THE OPPOSITE, BEING SO FIGETY OR RESTLESS THAT YOU HAVE BEEN MOVING AROUND A LOT MORE THAN USUAL: NOT AT ALL
SUM OF ALL RESPONSES TO PHQ9 QUESTIONS 1 AND 2: 0
SUM OF ALL RESPONSES TO PHQ QUESTIONS 1-9: 0
1. LITTLE INTEREST OR PLEASURE IN DOING THINGS: NOT AT ALL
6. FEELING BAD ABOUT YOURSELF - OR THAT YOU ARE A FAILURE OR HAVE LET YOURSELF OR YOUR FAMILY DOWN: NOT AL ALL
9. THOUGHTS THAT YOU WOULD BE BETTER OFF DEAD, OR OF HURTING YOURSELF: NOT AT ALL
3. TROUBLE FALLING OR STAYING ASLEEP OR SLEEPING TOO MUCH: NOT AT ALL
5. POOR APPETITE OR OVEREATING: NOT AT ALL
4. FEELING TIRED OR HAVING LITTLE ENERGY: NOT AT ALL
2. FEELING DOWN, DEPRESSED, IRRITABLE, OR HOPELESS: NOT AT ALL
7. TROUBLE CONCENTRATING ON THINGS, SUCH AS READING THE NEWSPAPER OR WATCHING TELEVISION: NOT AT ALL

## 2021-02-24 ASSESSMENT — FIBROSIS 4 INDEX: FIB4 SCORE: 0.76

## 2021-02-24 NOTE — PROGRESS NOTES
Chief Complaint   Patient presents with   • Annual Exam       HPI:  Beatriz is a 61 y.o.  female who presents for annual exam. Generally the patient is feeling great, sober for 6 months and feeling optimistic about life/the world.  She has no complaints or concerns.   Has lost 12 lbs in the past year.  Unfortunately still smoking 1 to 5 cigarettes/day, trying to quit that.  Regarding her health maintenance:   Last pap: 2019  Abnormal Pap hx: none  menopausal  Last Mammo:due, denies breast pain  Last colonoscopy:10/2020  Bone density test:N\A   Last Lab: due for follow up   Last Td:current always  Influenza vaccination:current   Pneumococcal vaccination:not applicable   Shingrix: UTD  Hx STD''s: no   Regular exercise: yes  Sober x the past 6 mo.    A1C up to 5.7% from 5.5 1 yr ago.  Eats a lot of vegetables / fruits.  Cut out alcohol 6 mo ago.  Eating 6 small meals per day.  Eats cherrios with probiotics and berries before bed.    Exercise:  Elliptical / bike    Component      Latest Ref Rng & Units 11/8/2018 2/15/2019 2/24/2020 9/2/2020           3:18 PM 11:44 AM  4:02 AM 10:32 AM   Cholesterol,Tot      100 - 199 mg/dL 312 (H) 328 (H) 328 (H) 268 (H)   Triglycerides      0 - 149 mg/dL 186 (H) 255 (H) 221 (H) 108   HDL      >=40 mg/dL 59 57 73 60   VLDL Cholesterol Calc      5 - 40 mg/dL 37 51 (H) 44 (H)    LDL      <100 mg/dL 216 (H) 220 (H) 211 (H) 186 (H)     Component      Latest Ref Rng & Units 2/20/2021           8:13 AM   Cholesterol,Tot      100 - 199 mg/dL 285 (H)   Triglycerides      0 - 149 mg/dL 122   HDL      >=40 mg/dL 63   VLDL Cholesterol Calc      5 - 40 mg/dL    LDL      <100 mg/dL 198 (H)       meds:   Current Outpatient Medications   Medication Sig Dispense Refill   • ALPRAZolam (XANAX) 1 MG Tab TAKE 1 TABLET BY MOUTH EVERY DAY AT BEDTIME AS NEEDED FOR SLEEP FOR UP TO 30 DAYS (DX  F51.01) 30 Tab 2   • fluticasone (FLONASE) 50 MCG/ACT nasal spray USE ONE SPRAY IN EACH NOSTRIL ONCE DAILY 48 g 3      No current facility-administered medications for this visit.       Allergies: No Known Allergies    family:   Family History   Problem Relation Age of Onset   • Hypertension Mother    • Hyperlipidemia Mother    • Diabetes Mother         Dm type 1   • Hypertension Father    • Hyperlipidemia Father    • Stroke Father 78   • Cancer Father 79        Pancreatic   • Cancer Paternal Grandmother         Breast cancer mid 70s   • Hyperlipidemia Sister        social hx:   Social History     Socioeconomic History   • Marital status: Single     Spouse name: Not on file   • Number of children: 1   • Years of education: Not on file   • Highest education level: Associate degree: occupational, technical, or vocational program   Occupational History   • Occupation:      Employer: [employer]   Tobacco Use   • Smoking status: Former Smoker     Packs/day: 0.02     Years: 1.00     Pack years: 0.02     Types: Cigarettes     Quit date: 2017     Years since quittin.0   • Smokeless tobacco: Never Used   Substance and Sexual Activity   • Alcohol use: Yes     Comment: occasional   • Drug use: No   • Sexual activity: Not Currently   Other Topics Concern   • Not on file   Social History Narrative    ** Merged History Encounter **          Social Determinants of Health     Financial Resource Strain: Low Risk    • Difficulty of Paying Living Expenses: Not hard at all   Food Insecurity: No Food Insecurity   • Worried About Running Out of Food in the Last Year: Never true   • Ran Out of Food in the Last Year: Never true   Transportation Needs: No Transportation Needs   • Lack of Transportation (Medical): No   • Lack of Transportation (Non-Medical): No   Physical Activity: Sufficiently Active   • Days of Exercise per Week: 6 days   • Minutes of Exercise per Session: 60 min   Stress: Stress Concern Present   • Feeling of Stress : Very much   Social Connections: Unknown   • Frequency of Communication with Friends and Family:  "Twice a week   • Frequency of Social Gatherings with Friends and Family: More than three times a week   • Attends [employer] Services: More than 4 times per year   • Active Member of Clubs or Organizations: Yes   • Attends Club or Organization Meetings: More than 4 times per year   • Marital Status: Patient refused   Intimate Partner Violence:    • Fear of Current or Ex-Partner:    • Emotionally Abused:    • Physically Abused:    • Sexually Abused:        ROS:  No fever, chills, sweats.   No polydipsia, polyuria, temperature intolerance, significant weight changes   No visual changes, blurred vision.  No chest pain, palpitations, peripheral swelling   No chronic cough, shortness of breath, dyspnea with exertion.   No dysphagia, odynophagia, black or bloody stools.   No abdominal pain, nausea, persistent diarrhea, constipation   No dysuria, hematuria, incontinence. Denies nocturia  No rash, pruritis, pigment changes.   No focal weakness, syncope, headache, confusion, persistent numbness.   All other systems are reviewed and negative.    PHYSICAL EXAMINATION:  BP (!) 98/64 (BP Location: Left arm, Patient Position: Sitting, BP Cuff Size: Adult)   Pulse 78   Temp 36 °C (96.8 °F)   Resp 12   Ht 1.727 m (5' 8\")   Wt 68 kg (150 lb)   SpO2 99%   General appearance:healthy, well developed, well nourished  Psych: alert, no distress, cooperative  Eyes: EOM's normal, pupils equal, round, reactive to light  ENT: Ears: external ears normal to inspection and palpation, TM's clear, Nose/Sinuses: nose shows no deformity, asymmetry, or inflammation  Neck: no asymmetry, masses, or scars, no adenopathy, thyroid normal to palpation  Lungs:chest symmetric with normal A/P diameter, no chest deformities noted, normal respiratory rate and rhythm  Cardiovascular:regular rate and rhythm, S1 normal  Breasts: normal in size and symmetry, skin normal, physiologic fibronodularity  Abdomen: umbilicus normal, no masses palpable, no " organomegaly  Musculoskeletal:ROM of all joints is normal, no evidence of joint instability  Lymphatic: None significantly enlarged  Skin: no rash, no edema  Neuro: mental status intact, cranial nerves 2-12 intact      ASSESSMENT/PLAN:  1.annual physical exam: HCM:   BSE technique reviewed and patient encouraged to perform self-exam monthly.   Encourage monthly self breast exam  Encourage daily exercise for at least 30 minutes  Recommend mammogram and DEXA 2022  Recommend 1500 mg Calcium with 600 units vit d daily.    Pap q 3 yrs  Absolutely refuses any type of statin therapy or discussion of referral to vascular medicine for initiation of Repatha.  She tells me that if she dies of heart disease, that was what she was meant today of and has no interest in treating it.  She also has no interest in having a CT cardiac scoring.  Discussed that she most likely has familial hyperlipidemia.  She is improving her lifestyle and we discussed the importance of a high vegetable-based diet.  Encouraged her to quit smoking.

## 2021-02-25 DIAGNOSIS — F51.01 PRIMARY INSOMNIA: ICD-10-CM

## 2021-02-25 RX ORDER — TRAZODONE HYDROCHLORIDE 100 MG/1
TABLET ORAL
Qty: 30 TABLET | Refills: 1 | Status: SHIPPED | OUTPATIENT
Start: 2021-02-25 | End: 2021-04-05

## 2021-03-15 DIAGNOSIS — Z23 NEED FOR VACCINATION: ICD-10-CM

## 2021-04-05 DIAGNOSIS — F51.01 PRIMARY INSOMNIA: ICD-10-CM

## 2021-04-05 RX ORDER — ALPRAZOLAM 1 MG/1
TABLET ORAL
Qty: 30 TABLET | Refills: 2 | Status: SHIPPED | OUTPATIENT
Start: 2021-04-05 | End: 2023-01-10 | Stop reason: SDUPTHER

## 2021-04-06 ENCOUNTER — IMMUNIZATION (OUTPATIENT)
Dept: FAMILY PLANNING/WOMEN'S HEALTH CLINIC | Facility: IMMUNIZATION CENTER | Age: 61
End: 2021-04-06
Attending: INTERNAL MEDICINE
Payer: COMMERCIAL

## 2021-04-06 DIAGNOSIS — Z23 NEED FOR VACCINATION: ICD-10-CM

## 2021-04-06 DIAGNOSIS — Z23 ENCOUNTER FOR VACCINATION: Primary | ICD-10-CM

## 2021-04-06 PROCEDURE — 91300 PFIZER SARS-COV-2 VACCINE: CPT

## 2021-04-06 PROCEDURE — 0001A PFIZER SARS-COV-2 VACCINE: CPT

## 2021-05-13 ENCOUNTER — IMMUNIZATION (OUTPATIENT)
Dept: FAMILY PLANNING/WOMEN'S HEALTH CLINIC | Facility: IMMUNIZATION CENTER | Age: 61
End: 2021-05-13
Payer: COMMERCIAL

## 2021-05-13 DIAGNOSIS — Z23 ENCOUNTER FOR VACCINATION: Primary | ICD-10-CM

## 2021-05-13 PROCEDURE — 91300 PFIZER SARS-COV-2 VACCINE: CPT | Performed by: INTERNAL MEDICINE

## 2021-05-13 PROCEDURE — 0002A PFIZER SARS-COV-2 VACCINE: CPT | Performed by: INTERNAL MEDICINE

## 2021-07-16 ENCOUNTER — PATIENT MESSAGE (OUTPATIENT)
Dept: MEDICAL GROUP | Facility: LAB | Age: 61
End: 2021-07-16

## 2021-07-16 DIAGNOSIS — F43.21 GRIEF: ICD-10-CM

## 2022-02-03 ENCOUNTER — HOSPITAL ENCOUNTER (OUTPATIENT)
Dept: RADIOLOGY | Facility: MEDICAL CENTER | Age: 62
End: 2022-02-03
Attending: NURSE PRACTITIONER
Payer: COMMERCIAL

## 2022-02-03 DIAGNOSIS — Z12.31 VISIT FOR SCREENING MAMMOGRAM: ICD-10-CM

## 2022-02-03 PROCEDURE — 77063 BREAST TOMOSYNTHESIS BI: CPT

## 2022-02-24 ENCOUNTER — PATIENT MESSAGE (OUTPATIENT)
Dept: MEDICAL GROUP | Facility: LAB | Age: 62
End: 2022-02-24
Payer: COMMERCIAL

## 2022-02-24 DIAGNOSIS — Z00.00 PREVENTATIVE HEALTH CARE: ICD-10-CM

## 2022-03-24 LAB
ALBUMIN SERPL-MCNC: 4.6 G/DL (ref 3.8–4.9)
ALBUMIN/GLOB SERPL: 2 {RATIO} (ref 1.2–2.2)
ALP SERPL-CCNC: 55 IU/L (ref 44–121)
ALT SERPL-CCNC: 19 IU/L (ref 0–32)
AST SERPL-CCNC: 20 IU/L (ref 0–40)
BASOPHILS # BLD AUTO: 0 X10E3/UL (ref 0–0.2)
BASOPHILS NFR BLD AUTO: 1 %
BILIRUB SERPL-MCNC: 0.6 MG/DL (ref 0–1.2)
BUN SERPL-MCNC: 13 MG/DL (ref 8–27)
BUN/CREAT SERPL: 16 (ref 12–28)
CALCIUM SERPL-MCNC: 9.3 MG/DL (ref 8.7–10.3)
CHLORIDE SERPL-SCNC: 102 MMOL/L (ref 96–106)
CHOLEST SERPL-MCNC: 291 MG/DL (ref 100–199)
CO2 SERPL-SCNC: 18 MMOL/L (ref 20–29)
CREAT SERPL-MCNC: 0.79 MG/DL (ref 0.57–1)
EGFRCR SERPLBLD CKD-EPI 2021: 86 ML/MIN/1.73
EOSINOPHIL # BLD AUTO: 0.1 X10E3/UL (ref 0–0.4)
EOSINOPHIL NFR BLD AUTO: 1 %
ERYTHROCYTE [DISTWIDTH] IN BLOOD BY AUTOMATED COUNT: 12 % (ref 11.7–15.4)
GLOBULIN SER CALC-MCNC: 2.3 G/DL (ref 1.5–4.5)
GLUCOSE SERPL-MCNC: 113 MG/DL (ref 65–99)
HBA1C MFR BLD: 5.3 % (ref 4.8–5.6)
HCT VFR BLD AUTO: 44.5 % (ref 34–46.6)
HDLC SERPL-MCNC: 69 MG/DL
HGB BLD-MCNC: 15.3 G/DL (ref 11.1–15.9)
IMM GRANULOCYTES # BLD AUTO: 0 X10E3/UL (ref 0–0.1)
IMM GRANULOCYTES NFR BLD AUTO: 0 %
IMMATURE CELLS  115398: NORMAL
LABORATORY COMMENT REPORT: ABNORMAL
LDLC SERPL CALC-MCNC: 202 MG/DL (ref 0–99)
LYMPHOCYTES # BLD AUTO: 2.2 X10E3/UL (ref 0.7–3.1)
LYMPHOCYTES NFR BLD AUTO: 29 %
MCH RBC QN AUTO: 31 PG (ref 26.6–33)
MCHC RBC AUTO-ENTMCNC: 34.4 G/DL (ref 31.5–35.7)
MCV RBC AUTO: 90 FL (ref 79–97)
MONOCYTES # BLD AUTO: 0.4 X10E3/UL (ref 0.1–0.9)
MONOCYTES NFR BLD AUTO: 6 %
MORPHOLOGY BLD-IMP: NORMAL
NEUTROPHILS # BLD AUTO: 4.9 X10E3/UL (ref 1.4–7)
NEUTROPHILS NFR BLD AUTO: 63 %
NRBC BLD AUTO-RTO: NORMAL %
PLATELET # BLD AUTO: 279 X10E3/UL (ref 150–450)
POTASSIUM SERPL-SCNC: 4.3 MMOL/L (ref 3.5–5.2)
PROT SERPL-MCNC: 6.9 G/DL (ref 6–8.5)
RBC # BLD AUTO: 4.94 X10E6/UL (ref 3.77–5.28)
SODIUM SERPL-SCNC: 137 MMOL/L (ref 134–144)
TRIGL SERPL-MCNC: 115 MG/DL (ref 0–149)
TSH SERPL DL<=0.005 MIU/L-ACNC: 1.82 UIU/ML (ref 0.45–4.5)
VLDLC SERPL CALC-MCNC: 20 MG/DL (ref 5–40)
WBC # BLD AUTO: 7.7 X10E3/UL (ref 3.4–10.8)

## 2022-03-28 ENCOUNTER — OFFICE VISIT (OUTPATIENT)
Dept: MEDICAL GROUP | Facility: LAB | Age: 62
End: 2022-03-28
Payer: COMMERCIAL

## 2022-03-28 ENCOUNTER — HOSPITAL ENCOUNTER (OUTPATIENT)
Facility: MEDICAL CENTER | Age: 62
End: 2022-03-28
Attending: NURSE PRACTITIONER
Payer: COMMERCIAL

## 2022-03-28 VITALS
RESPIRATION RATE: 12 BRPM | SYSTOLIC BLOOD PRESSURE: 98 MMHG | TEMPERATURE: 96.6 F | OXYGEN SATURATION: 97 % | DIASTOLIC BLOOD PRESSURE: 64 MMHG | HEART RATE: 68 BPM | WEIGHT: 149 LBS | BODY MASS INDEX: 22.58 KG/M2 | HEIGHT: 68 IN

## 2022-03-28 DIAGNOSIS — J32.9 CHRONIC SINUSITIS, UNSPECIFIED LOCATION: ICD-10-CM

## 2022-03-28 DIAGNOSIS — Z12.4 SCREENING FOR MALIGNANT NEOPLASM OF CERVIX: ICD-10-CM

## 2022-03-28 DIAGNOSIS — E78.49 FAMILIAL HYPERLIPIDEMIA: ICD-10-CM

## 2022-03-28 DIAGNOSIS — F39 MOOD DISORDER (HCC): ICD-10-CM

## 2022-03-28 DIAGNOSIS — Z01.419 ENCOUNTER FOR GYNECOLOGICAL EXAMINATION: ICD-10-CM

## 2022-03-28 DIAGNOSIS — Z91.89 OTHER SPECIFIED PERSONAL RISK FACTORS, NOT ELSEWHERE CLASSIFIED: ICD-10-CM

## 2022-03-28 PROBLEM — S22.41XA FRACTURE OF MULTIPLE RIBS OF RIGHT SIDE: Status: RESOLVED | Noted: 2018-11-11 | Resolved: 2022-03-28

## 2022-03-28 PROBLEM — T14.90XA TRAUMA: Status: RESOLVED | Noted: 2018-11-11 | Resolved: 2022-03-28

## 2022-03-28 PROBLEM — S27.0XXA TRAUMATIC PNEUMOTHORAX: Status: RESOLVED | Noted: 2018-11-11 | Resolved: 2022-03-28

## 2022-03-28 PROCEDURE — 88175 CYTOPATH C/V AUTO FLUID REDO: CPT

## 2022-03-28 PROCEDURE — 99396 PREV VISIT EST AGE 40-64: CPT | Performed by: NURSE PRACTITIONER

## 2022-03-28 ASSESSMENT — FIBROSIS 4 INDEX: FIB4 SCORE: 0.99

## 2022-03-28 ASSESSMENT — PATIENT HEALTH QUESTIONNAIRE - PHQ9: CLINICAL INTERPRETATION OF PHQ2 SCORE: 0

## 2022-03-28 NOTE — PROGRESS NOTES
Chief Complaint   Patient presents with   • Annual Exam     PAP       HPI:  Beatriz is a 62 y.o. female who presents for annual exam.  She is concerned about mood stability, stating that she constantly feels either very high or very low and is wondering if she has a chemical imbalance.  Mood instability has been a chronic issue for her.  She has never seen a psychiatrist for an exact diagnoses and is not interested in medication today.  Denies SI or HI.  She did lose her  in July 2021 which was obviously difficult for her.  Also complains of Two years of sinus pressure and copious amounts of mucus with dizziness / hearing changes.  Mucus thick yellow in the morning.  Has not seen ENT.  Has failed flonase / claritin / sinus rinses.    Known history of familial hyperlipidemia and is very resistant to cholesterol medication of any kind but is willing to have a CT cardiac score.  Smoking about 3 cig per day.  Regarding her health maintenance:   Last pap: 2019  Abnormal Pap hx: none  Periods: menopausal  Last Mammo: 2022  Last colonoscopy:2020 - repeat 10 years  Bone density test:N\A - done 2017  Last Lab: due for follow up   Last Td:current   Influenza vaccination:current   Pneumococcal vaccination:not applicable   Hx STD''s: no   Regular exercise: yes      meds:   none    Allergies: No Known Allergies    family:   Family History   Problem Relation Age of Onset   • Hypertension Mother    • Hyperlipidemia Mother    • Diabetes Mother         Dm type 1   • Hypertension Father    • Hyperlipidemia Father    • Stroke Father 78   • Cancer Father 79        Pancreatic   • Cancer Paternal Grandmother         Breast cancer mid 70s   • Hyperlipidemia Sister        ROS:  No fever, chills, sweats.   No polydipsia, polyuria, temperature intolerance, significant weight changes   No visual changes, blurred vision.  No chest pain, palpitations, peripheral swelling   No chronic cough, shortness of breath, dyspnea with exertion.  "  No dysphagia, odynophagia, black or bloody stools.   No abdominal pain, nausea, persistent diarrhea, constipation   No dysuria, hematuria, incontinence. Denies nocturia  No rash, pruritis, pigment changes.   No focal weakness, syncope, headache, confusion, persistent numbness.   All other systems are reviewed and negative.    PHYSICAL EXAMINATION:  BP (!) 98/64 (BP Location: Right arm, Patient Position: Sitting, BP Cuff Size: Adult)   Pulse 68   Temp 35.9 °C (96.6 °F)   Resp 12   Ht 1.727 m (5' 8\")   Wt 67.6 kg (149 lb)   SpO2 97%   General appearance:healthy, well developed, well nourished  Psych: alert, no distress, cooperative  Eyes: EOM's normal, pupils equal, round, reactive to light  ENT: Ears: external ears normal to inspection and palpation, TM's clear, Nose/Sinuses: nose shows no deformity, asymmetry, or inflammation  Neck: no asymmetry, masses, or scars, no adenopathy, thyroid normal to palpation  Lungs:chest symmetric with normal A/P diameter, no chest deformities noted, normal respiratory rate and rhythm  Cardiovascular:regular rate and rhythm, S1 normal  Breasts: normal in size and symmetry, skin normal, physiologic fibronodularity  Abdomen: umbilicus normal, no masses palpable, no organomegaly  Musculoskeletal:ROM of all joints is normal, no evidence of joint instability  Lymphatic: None significantly enlarged  Skin: no rash, no edema  Neuro: mental status intact, cranial nerves 2-12 intact  Pelvic: external genitalia normal, vaginal atrophy present, cervix normal in appearance, bimanual exam reveals normal uterus, adnexa without masses or tenderness, vaginal mucosa normal       ASSESSMENT/PLAN:  1.annual physical exam: HCM:  Pap and breast exams done.  BSE technique reviewed and patient encouraged to perform self-exam monthly.   Encourage monthly self breast exam  Encourage daily exercise for at least 30 minutes  Recommend mammogram yearly, this has been completed.  Bone density in the next 5 " years.  Reviewed last bone density which was normal.  Discussed the importance of continued weightbearing exercise.  Recommend 1500 mg Calcium with 600 units vit d daily.    Pap smears every 3 years if today's is normal  Reviewed all lab work.  Definite familial hyperlipidemia.  Recommend CT cardiac score.  Referred to ENT and psychiatry in regards to information HPI.

## 2022-03-29 LAB — CYTOLOGY REG CYTOL: NORMAL

## 2022-09-19 ENCOUNTER — PHYSICAL THERAPY (OUTPATIENT)
Dept: PHYSICAL THERAPY | Facility: MEDICAL CENTER | Age: 62
End: 2022-09-19
Attending: NURSE PRACTITIONER
Payer: COMMERCIAL

## 2022-09-19 DIAGNOSIS — M62.838 MUSCLE SPASMS OF NECK: ICD-10-CM

## 2022-09-19 DIAGNOSIS — M25.511 CHRONIC RIGHT SHOULDER PAIN: ICD-10-CM

## 2022-09-19 DIAGNOSIS — G89.29 CHRONIC RIGHT SHOULDER PAIN: ICD-10-CM

## 2022-09-19 DIAGNOSIS — R29.898 NECK TIGHTNESS: ICD-10-CM

## 2022-09-19 PROCEDURE — 97161 PT EVAL LOW COMPLEX 20 MIN: CPT

## 2022-09-19 PROCEDURE — 97110 THERAPEUTIC EXERCISES: CPT

## 2022-09-19 ASSESSMENT — ENCOUNTER SYMPTOMS
PAIN SCALE AT HIGHEST: 5
ALLEVIATING FACTORS: MASSAGE
PAIN TIMING: EVERY EVENING
PAIN SCALE AT LOWEST: 3
PAIN SCALE: 4
QUALITY: ACHING

## 2022-09-19 NOTE — OP THERAPY EVALUATION
"  Outpatient Physical Therapy  INITIAL EVALUATION    [facility]  [address]  [place] [address]  Phone:  [phone]  Fax:  [phone]    Date of Evaluation: 2022    Patient: Beatriz Crespo  YOB: 1960  MRN: [id number]     Referring Provider: VIMAL Baltazar  [address] [facility]  [address]  [place] [address]   Referring Diagnosis Muscle spasms of neck [M62.838];Neck tightness [R29.898];Chronic right shoulder pain [M25.511, G89.29]     Time Calculation  Start time: 931  Stop time:  Time Calculation (min): 43 minutes         Chief Complaint: Neck Problem and Shoulder Problem    Visit Diagnoses     ICD-10-CM   1. Muscle spasms of neck  M62.838   2. Neck tightness  R29.898   3. Chronic right shoulder pain  M25.511    G89.29       Date of onset of impairment: 2022    Subjective:   History of Present Illness:     Mechanism of injury:    Pt presents to PT with complaint of R shoulder and neck pain. Describes the pain as intermittent and present in the right side of neck and down to shoulder / bottom of shoulder and back of shoulder. \"I can move just fine, but I just keep getting huge knots in my neck and I need to understand why.\"  The problem is chronic for about 10 yrs, \"but it bugs me that I am older and it just keeps coming back.\"  Has tried massage which helps, \"but my hands get tired.\" Has tried a tennis ball and rolling pin. Reports a + history of R shoulder surgery for dislocation 23 yrs ago. Denies N/T in the UE.   Prior level of function:  Very physically active - dance, bike, light weights, yard.  Able to do all activities without neck / shoulder pain.  Sleep disturbance:  Not disrupted  Pain:     Current pain ratin    At best pain rating:  3    At worst pain ratin (When it gets higher than a 4 I get a massage)    Location:  R periscapular, and UT    Quality:  Aching (cramping, " knots, tight)    Pain timing:  Every evening    Relieving factors:  Massage    Exacerbated by: use of the R UE, mousing and computer work.    Progression:  Stable  Hand dominance:  Right  Diagnostic Tests:     None    Treatments:     Previous treatment:  Massage  Patient Goals:     Patient goals for therapy:  Decreased pain, increased motion, increased strength, return to sport/leisure activities and independence with ADLs/IADLs    Past Medical History:   Diagnosis Date    Dyspnea 3/22/2012    GERD (gastroesophageal reflux disease) 3/22/2012    Hyperlipidemia 8/3/2011    Insomnia 2014    Mixed hyperlipidemia 2015    Solar keratosis 8/3/2011     Past Surgical History:   Procedure Laterality Date    LYSIS ADHESIONS GYN      PRIMARY C SECTION      SHOULDER ARTHROSCOPY       Social History     Tobacco Use    Smoking status: Every Day     Packs/day: 0.02     Years: 1.00     Pack years: 0.02     Types: Cigarettes     Last attempt to quit: 2017     Years since quittin.5    Smokeless tobacco: Never    Tobacco comments:     3 per day apprx   Substance Use Topics    Alcohol use: Yes     Comment: occasional     Family and Occupational History     Socioeconomic History    Marital status:      Spouse name: Not on file    Number of children: 1    Years of education: Not on file    Highest education level: Associate degree: occupational, technical, or vocational program   Occupational History    Occupation:      Employer: [employer]       Objective     Postural Observations  Seated posture: good  Standing posture: good      Cervical Screen    Cervical range of motion within normal limits  Thoracic Screen    Thoracic range of motion within normal limits  Thoracic range of motion within normal limits with the following exceptions: Grossly hypermobile    Neurological Testing     Sensation     Shoulder   Left Shoulder   Intact: light touch    Right Shoulder   Intact: Light touch    Reflexes    Left   Biceps (C5/C6): normal (2+)  Brachioradialis (C6): normal (2+)    Palpation     Right   No palpable tenderness to the thoracic paraspinals.   Tenderness of the levator scapulae, rhomboids and upper trapezius.     Active Range of Motion   Left Shoulder   Normal active range of motion    Right Shoulder   Normal active range of motion    Additional Active Range of Motion Details  B shoulders are hypermobile. Flex= 185 deg, HBH= T4, HBB= T6.     Scapular Mobility     Right Shoulder   Scapular mobility: Hypermobile.  Scapular Mobility with Shoulder to 90° FF   Scapular winging: minimal    Scapular Mobility beyond 90° FF   Scapular winging: moderate  Upward rotation: excessive    Joint Play   Right Shoulder     Anterior capsule: hypermobile    Posterior capsule: hypermobile    Inferior capsule: hypermobile    1st rib: hypomobile    Strength:      Left Shoulder   Normal muscle strength    Right Shoulder   Planes of Motion   Flexion: 4-   Extension: 4+   Abduction: 4- (scapular winging)   Adduction: 4   External rotation at 0°: 4- (scapular winging)   Internal rotation at 0°: 4+     General Comments     Shoulder Comments   Unable to stabilize the R scapula in countertop plank. UT dominant protraction/retraction       Therapeutic Exercises (CPT 25464):     1. Re-ed scap stability in counter plank, video feedback via pts phone. x 3 min. Improved to indep, but fatigues after about 5 reps    2. Education regarding PT eval findings, PT POC and goals. Shown theracane for self TP release. HEP with return demo below. Pink band provided.      Therapeutic Exercise Summary: Access Code: [email or web address]  URL: [email or web address]  Date: 09/19/2022  Prepared by: Wendy Lopez    Exercises  Shoulder External Rotation and Scapular Retraction with Resistance - 3 x daily - 2 sets - 15 reps - 5 sec hold  Quadruped Scapular Protraction and Retraction - 1 x daily - 20 reps  Quadruped Shoulder Taps - 1 x daily - 20  reps      Time-based treatments/modalities:    Physical Therapy Timed Treatment Charges  Therapeutic exercise minutes (CPT 80618): 10 minutes      Assessment, Response and Plan:   Impairments: abnormal muscle tone, difficulty performing job, impaired functional mobility, impaired physical strength and lacks appropriate home exercise program    Assessment details:    Ms. Crespo is a 62 y.o female who presents to PT with complaint of  chronic R shoulder/neck pain, described as muscular knots that are worse in the evenings after she has been active with her R UE.  PT evaluation reveals poor scapulohumeral strength and stability on the R. Demonstrates winging patterns during elevation over 90 deg and in WBing. The problem limits her tolerance for work and gym activities. Skilled PT services are indicated to address the mentioned functional limitations and enhance QOL.     Barriers to therapy:  Out-of-pocket cost of treatment  Prognosis: good    Goals:   Short Term Goals:     - Pt is able to self correct to scapular set position in WBing  - Pt is able to demonstrate an inclined push up with good scapular control.   Short term goal time span:  1-2 weeks      Long Term Goals:      - Pt reports her sx improved through NDI being reduced by 12%  - Pt is able to overhead press 10# with good scapulohumeral control and no pain.   - Indep with HEP  Long term goal time span:  6-8 weeks    Plan:   Therapy options:  Physical therapy treatment to continue  Planned therapy interventions:  Manual Therapy (CPT 47429), Therapeutic Activities (CPT 12979), Therapeutic Exercise (CPT 05680), Neuromuscular Re-education (CPT 47665), E Stim Unattended (CPT 53552) and Hot or Cold Pack Therapy (CPT 56238)  Frequency:  1x week  Duration in weeks:  8  Discussed with:  Patient    Functional Assessment Used    NDI= 26%    Referring provider co-signature:  I have reviewed this plan of care and my co-signature certifies the need for  services.    Certification Period: 09/19/2022 to  11/14/22    Physician Signature: ________________________________ Date: ______________

## 2022-09-22 ENCOUNTER — APPOINTMENT (OUTPATIENT)
Dept: PHYSICAL THERAPY | Facility: MEDICAL CENTER | Age: 62
End: 2022-09-22
Attending: NURSE PRACTITIONER
Payer: COMMERCIAL

## 2022-10-14 ENCOUNTER — APPOINTMENT (OUTPATIENT)
Dept: PHYSICAL THERAPY | Facility: MEDICAL CENTER | Age: 62
End: 2022-10-14
Attending: NURSE PRACTITIONER
Payer: COMMERCIAL

## 2022-10-28 ENCOUNTER — TELEPHONE (OUTPATIENT)
Dept: PHYSICAL THERAPY | Facility: MEDICAL CENTER | Age: 62
End: 2022-10-28
Payer: COMMERCIAL

## 2022-10-28 NOTE — OP THERAPY DISCHARGE SUMMARY
Outpatient Physical Therapy  DISCHARGE SUMMARY NOTE      [facility]  [address]  [place] [address]  Phone:  [phone]  Fax:  [phone]    Date of Visit: 10/28/2022    Patient: Beatriz Crespo  YOB: 1960  MRN: [id number]     Referring Provider: VIMAL Baltazar  [address]  [place] [address]   Referring Diagnosis Muscle spasms of neck [M62.838];Neck tightness [R29.898];Chronic right shoulder pain [M25.511, G89.29]         Functional Assessment Used        Your patient is being discharged from Physical Therapy with the following comments:   Other    Comments:  Ms. Crespo was seen only for evaluation of her neck pain. Recommended continuation of care for scapular and postural stability training. Pt has not followed up in clinic in greater than 30 days. Will be d/c'd per facility protocol. Requires a new Rx to resume.      Wendy Lopez, PT, DPT    Date: 10/28/2022

## 2022-12-09 ENCOUNTER — HOSPITAL ENCOUNTER (OUTPATIENT)
Dept: LAB | Facility: MEDICAL CENTER | Age: 62
End: 2022-12-09
Attending: FAMILY MEDICINE
Payer: COMMERCIAL

## 2022-12-09 ENCOUNTER — HOSPITAL ENCOUNTER (OUTPATIENT)
Dept: RADIOLOGY | Facility: MEDICAL CENTER | Age: 62
End: 2022-12-09
Attending: FAMILY MEDICINE
Payer: COMMERCIAL

## 2022-12-09 ENCOUNTER — OFFICE VISIT (OUTPATIENT)
Dept: MEDICAL GROUP | Facility: LAB | Age: 62
End: 2022-12-09
Payer: COMMERCIAL

## 2022-12-09 VITALS
SYSTOLIC BLOOD PRESSURE: 102 MMHG | OXYGEN SATURATION: 96 % | BODY MASS INDEX: 22.66 KG/M2 | DIASTOLIC BLOOD PRESSURE: 64 MMHG | TEMPERATURE: 97 F | RESPIRATION RATE: 12 BRPM | HEIGHT: 68 IN | HEART RATE: 78 BPM

## 2022-12-09 DIAGNOSIS — R00.2 PALPITATIONS: ICD-10-CM

## 2022-12-09 DIAGNOSIS — E78.49 FAMILIAL HYPERLIPIDEMIA: ICD-10-CM

## 2022-12-09 DIAGNOSIS — R51.9 POUNDING IN HEAD: ICD-10-CM

## 2022-12-09 DIAGNOSIS — R09.89 CHEST CONGESTION: ICD-10-CM

## 2022-12-09 LAB
ALBUMIN SERPL BCP-MCNC: 4.7 G/DL (ref 3.2–4.9)
ALBUMIN/GLOB SERPL: 1.8 G/DL
ALP SERPL-CCNC: 56 U/L (ref 30–99)
ALT SERPL-CCNC: 18 U/L (ref 2–50)
ANION GAP SERPL CALC-SCNC: 10 MMOL/L (ref 7–16)
AST SERPL-CCNC: 18 U/L (ref 12–45)
BASOPHILS # BLD AUTO: 0.9 % (ref 0–1.8)
BASOPHILS # BLD: 0.06 K/UL (ref 0–0.12)
BILIRUB SERPL-MCNC: 0.5 MG/DL (ref 0.1–1.5)
BUN SERPL-MCNC: 20 MG/DL (ref 8–22)
CALCIUM SERPL-MCNC: 10 MG/DL (ref 8.5–10.5)
CHLORIDE SERPL-SCNC: 104 MMOL/L (ref 96–112)
CO2 SERPL-SCNC: 27 MMOL/L (ref 20–33)
CREAT SERPL-MCNC: 0.75 MG/DL (ref 0.5–1.4)
EOSINOPHIL # BLD AUTO: 0.06 K/UL (ref 0–0.51)
EOSINOPHIL NFR BLD: 0.9 % (ref 0–6.9)
ERYTHROCYTE [DISTWIDTH] IN BLOOD BY AUTOMATED COUNT: 40.8 FL (ref 35.9–50)
FASTING STATUS PATIENT QL REPORTED: NORMAL
GFR SERPLBLD CREATININE-BSD FMLA CKD-EPI: 91 ML/MIN/1.73 M 2
GLOBULIN SER CALC-MCNC: 2.6 G/DL (ref 1.9–3.5)
GLUCOSE SERPL-MCNC: 77 MG/DL (ref 65–99)
HCT VFR BLD AUTO: 47.4 % (ref 37–47)
HGB BLD-MCNC: 15.6 G/DL (ref 12–16)
IMM GRANULOCYTES # BLD AUTO: 0.01 K/UL (ref 0–0.11)
IMM GRANULOCYTES NFR BLD AUTO: 0.2 % (ref 0–0.9)
LYMPHOCYTES # BLD AUTO: 2.06 K/UL (ref 1–4.8)
LYMPHOCYTES NFR BLD: 32.5 % (ref 22–41)
MCH RBC QN AUTO: 30.1 PG (ref 27–33)
MCHC RBC AUTO-ENTMCNC: 32.9 G/DL (ref 33.6–35)
MCV RBC AUTO: 91.5 FL (ref 81.4–97.8)
MONOCYTES # BLD AUTO: 0.44 K/UL (ref 0–0.85)
MONOCYTES NFR BLD AUTO: 7 % (ref 0–13.4)
NEUTROPHILS # BLD AUTO: 3.7 K/UL (ref 2–7.15)
NEUTROPHILS NFR BLD: 58.5 % (ref 44–72)
NRBC # BLD AUTO: 0 K/UL
NRBC BLD-RTO: 0 /100 WBC
PLATELET # BLD AUTO: 269 K/UL (ref 164–446)
PMV BLD AUTO: 10 FL (ref 9–12.9)
POTASSIUM SERPL-SCNC: 4.2 MMOL/L (ref 3.6–5.5)
PROT SERPL-MCNC: 7.3 G/DL (ref 6–8.2)
RBC # BLD AUTO: 5.18 M/UL (ref 4.2–5.4)
SODIUM SERPL-SCNC: 141 MMOL/L (ref 135–145)
TSH SERPL DL<=0.005 MIU/L-ACNC: 1.95 UIU/ML (ref 0.38–5.33)
WBC # BLD AUTO: 6.3 K/UL (ref 4.8–10.8)

## 2022-12-09 PROCEDURE — 85025 COMPLETE CBC W/AUTO DIFF WBC: CPT

## 2022-12-09 PROCEDURE — 80053 COMPREHEN METABOLIC PANEL: CPT

## 2022-12-09 PROCEDURE — 93000 ELECTROCARDIOGRAM COMPLETE: CPT | Performed by: FAMILY MEDICINE

## 2022-12-09 PROCEDURE — 99214 OFFICE O/P EST MOD 30 MIN: CPT | Mod: 25 | Performed by: FAMILY MEDICINE

## 2022-12-09 PROCEDURE — 36415 COLL VENOUS BLD VENIPUNCTURE: CPT

## 2022-12-09 PROCEDURE — 71046 X-RAY EXAM CHEST 2 VIEWS: CPT

## 2022-12-09 PROCEDURE — 84443 ASSAY THYROID STIM HORMONE: CPT

## 2022-12-09 NOTE — PROGRESS NOTES
"Subjective:     CC: \"worried\"    HPI:   Beatriz is a 62-year-old female with a history of anxiety after her  passed away last year.  She presents today with multiple concerns, mainly being that she is worried about an undiagnosed underlying health issue.  She was diagnosed with COVID 2 months ago and has had some continued symptoms since then.    Her symptoms are described as: Hot flashes, heart and head pounding. \"Brain touching skull\" , \"audio is off\".  She reports an episode 4 days ago when she fell asleep while working on the computer.  No headache, palpitations, or chest pain prior to this. Chest does feel congested since covid.    Her symptoms seem to come and go with no obvious triggers.  She is able to exercise without issues and does not have any exertional chest pain or shortness of breath.  No exertional syncope.    She does endorse high stress and anxiety since  passed away, denies suicidal ideation but does report she has had a hard time dealing with this.    Had Brain MRI 7/2019 with no acute findings for similar symptoms  Reports Hospitalized in [place] in 2019 for similar symptoms - no discrete diagnosis.    No new medications, no new medical issues  Taking 1/2 tab Xanax at night, marijuana at night      ROS:  No measured fevers, no nausea or vomiting, no diarrhea.  No current numbness/weakness.  No speech difficulty.    Medications, past medical history, allergies, and social history have been reviewed and updated.      Objective:       Exam:  /64 (BP Location: Left arm, Patient Position: Sitting, BP Cuff Size: Adult)   Pulse 78   Temp 36.1 °C (97 °F)   Resp 12   Ht 1.727 m (5' 8\")   LMP 11/21/2012 Comment: last LMP 3 years ago  SpO2 96%   BMI 22.66 kg/m²  Body mass index is 22.66 kg/m².    Constitutional: Alert.  Extremely anxious, teary.  Skin: Warm, dry, good turgor, no visible rashes.  Eye: Equal, round and reactive to light, conjunctiva clear, lids normal.  ENMT: " "Moist mucous membranes.   Respiratory: Normal effort. Lungs are clear to auscultation bilaterally.  Cardiovascular: Regular rate and rhythm. Normal S1/S2. No murmurs, rubs or gallops.   Ext: Radial pulses 2+ and symmetric.  No lower extremity edema.  Neuro: Moves all four extremities. No facial droop.  Psych: Answers questions appropriately. Normal affect and mood.    EKG interpretation by me: NSR. HR is 74 . Axis is normal. No ST or QRS morphologic changes. No T-wave inversions.  DC interval is borderline short, normal QT interval.  Quality of EKG is good.        Assessment & Plan:     62 y.o. female patient of Macie León who presents with multiple concerns.  Per patient and chart review she has been dealing with some anxiety since  passing away last year.  Today she is worried about multiple recent symptoms including: \" Feeling that brain is touching skull\", palpitations, falling asleep while at her computer desk, continued chest congestion since COVID.  Vitals normal.  EKG with sinus rhythm.  Exam is unremarkable besides extreme anxiety. She did have a brain MRI in 2019 for similar symptoms that did not have any acute findings.    Do think her symptoms may be secondary to anxiety/panic but will go ahead with basic labs and chest x-ray.  Close follow-up will be scheduled with her PCP.  If palpitations continue could consider Holter monitor.  Untreated familial hyperlipidemia would put her at higher risk for coronary disease but symptoms are not consistent with this today. Discussed ER precautions.    1. Palpitations  2. Pounding in head  - CBC WITH DIFFERENTIAL; Future  - Comp Metabolic Panel; Future  - TSH WITH REFLEX TO FT4; Future  - DX-CHEST-2 VIEWS; Future    3. Chest congestion  - DX-CHEST-2 VIEWS; Future    4. Familial hyperlipidemia - absolutely refuses tx - both statin / repatha      Please note that this note was created using voice recognition software.      "

## 2022-12-13 ENCOUNTER — OFFICE VISIT (OUTPATIENT)
Dept: MEDICAL GROUP | Facility: LAB | Age: 62
End: 2022-12-13
Payer: COMMERCIAL

## 2022-12-13 VITALS
RESPIRATION RATE: 12 BRPM | DIASTOLIC BLOOD PRESSURE: 62 MMHG | WEIGHT: 138 LBS | OXYGEN SATURATION: 96 % | HEIGHT: 68 IN | BODY MASS INDEX: 20.92 KG/M2 | TEMPERATURE: 96 F | SYSTOLIC BLOOD PRESSURE: 108 MMHG | HEART RATE: 84 BPM

## 2022-12-13 DIAGNOSIS — R00.2 HEART PALPITATIONS: ICD-10-CM

## 2022-12-13 DIAGNOSIS — Z63.8 STRESS DUE TO FAMILY TENSION: ICD-10-CM

## 2022-12-13 DIAGNOSIS — F43.21 GRIEF: ICD-10-CM

## 2022-12-13 DIAGNOSIS — F10.11 HISTORY OF ALCOHOL ABUSE: ICD-10-CM

## 2022-12-13 PROCEDURE — 99214 OFFICE O/P EST MOD 30 MIN: CPT | Performed by: NURSE PRACTITIONER

## 2022-12-13 SDOH — SOCIAL STABILITY - SOCIAL INSECURITY: OTHER SPECIFIED PROBLEMS RELATED TO PRIMARY SUPPORT GROUP: Z63.8

## 2022-12-13 ASSESSMENT — FIBROSIS 4 INDEX: FIB4 SCORE: 0.95

## 2022-12-14 NOTE — PROGRESS NOTES
"Chief Complaint   Patient presents with    Follow-Up     1 week follow up        HPI:  Beatriz is a 62-year-old established female here with complaint of heart palpitations, exacerbation of stress/anxiety and to review recent testing done by another provider in our office last week.  Has had heart palpitations since covid dx 10/2022 - saw Dr. RETANA last week - blood work  /CXR - all negative.    Feeling heart palpitations for about 20 seconds at least daily.   Hx of heart palpitations years ago with etoh use.  Stopped drinking 2.5 yrs ago - drank heavily x 4 years.   Current palpations are persistent, not improving or worsening.  Denies associated chest pain.  Lives alone and works from home.   [place] - this is very stressful for her due to family personalities.   Is ready to see a therapist.   Goes to AA meetings and uses her [employer] for support.    Exam:  /62 (BP Location: Right arm, Patient Position: Sitting, BP Cuff Size: Adult)   Pulse 84   Temp (!) 35.6 °C (96 °F)   Resp 12   Ht 1.727 m (5' 8\")   Wt 62.6 kg (138 lb)   SpO2 96%   Gen. appears healthy in no distress   Skin appropriate for sex and age   Neck trachea is midline  Lungs unlabored breathing.  Lungs are clear to auscultation bilaterally  Heart regular rate and rhythm  Neuro gait and station normal   Psych appropriate, calm, interactive, well-groomed.  Tearful easily when talking about her family.      A/P:  \"  1. Heart palpitations  HOLTER - Cardiology Performed (48HR)      2. Stress due to family tension  Referral to Behavioral Health      3. Grief  Referral to Behavioral Health      4. History of alcohol abuse  Referral to Behavioral Health      \"  Recommend Holter monitor, 48-hour 1 to capture heart palpitations and rule out any concerning heart arrhythmias.  Discussed typical benign nature of heart palpitations.  Capitation's not heard today during exam.  Reviewed all labs and negative chest x-ray.  " Referred for her to start seeing psychology.  Discussed over-the-counter natural supplements such as THAO, valerian root and melatonin for nighttime use, okay to use THAO during the day.  Encouraged her to follow-up with me after 1-2 psychology sessions to let me know how she is doing and I will let her know as soon as her Holter monitor returns.  I encouraged her to go the emergency department if she begins to have chest pain, especially if it is associated with shortness of breath, diaphoresis or radiating pain.

## 2023-01-10 DIAGNOSIS — F51.01 PRIMARY INSOMNIA: ICD-10-CM

## 2023-01-10 RX ORDER — ALPRAZOLAM 1 MG/1
.5-1 TABLET ORAL NIGHTLY PRN
Qty: 30 TABLET | Refills: 2 | Status: SHIPPED | OUTPATIENT
Start: 2023-01-10 | End: 2023-03-15 | Stop reason: SDUPTHER

## 2023-02-16 ENCOUNTER — HOSPITAL ENCOUNTER (OUTPATIENT)
Dept: LAB | Facility: MEDICAL CENTER | Age: 63
End: 2023-02-16
Attending: NURSE PRACTITIONER
Payer: COMMERCIAL

## 2023-02-16 DIAGNOSIS — Z00.00 PREVENTATIVE HEALTH CARE: ICD-10-CM

## 2023-02-16 LAB
ALBUMIN SERPL BCP-MCNC: 4.7 G/DL (ref 3.2–4.9)
ALBUMIN/GLOB SERPL: 1.7 G/DL
ALP SERPL-CCNC: 54 U/L (ref 30–99)
ALT SERPL-CCNC: 16 U/L (ref 2–50)
ANION GAP SERPL CALC-SCNC: 10 MMOL/L (ref 7–16)
AST SERPL-CCNC: 23 U/L (ref 12–45)
BASOPHILS # BLD AUTO: 0.6 % (ref 0–1.8)
BASOPHILS # BLD: 0.05 K/UL (ref 0–0.12)
BILIRUB SERPL-MCNC: 0.6 MG/DL (ref 0.1–1.5)
BUN SERPL-MCNC: 15 MG/DL (ref 8–22)
CALCIUM ALBUM COR SERPL-MCNC: 8.9 MG/DL (ref 8.5–10.5)
CALCIUM SERPL-MCNC: 9.5 MG/DL (ref 8.5–10.5)
CHLORIDE SERPL-SCNC: 103 MMOL/L (ref 96–112)
CHOLEST SERPL-MCNC: 283 MG/DL (ref 100–199)
CO2 SERPL-SCNC: 26 MMOL/L (ref 20–33)
CREAT SERPL-MCNC: 0.67 MG/DL (ref 0.5–1.4)
EOSINOPHIL # BLD AUTO: 0.05 K/UL (ref 0–0.51)
EOSINOPHIL NFR BLD: 0.6 % (ref 0–6.9)
ERYTHROCYTE [DISTWIDTH] IN BLOOD BY AUTOMATED COUNT: 39.6 FL (ref 35.9–50)
EST. AVERAGE GLUCOSE BLD GHB EST-MCNC: 108 MG/DL
GFR SERPLBLD CREATININE-BSD FMLA CKD-EPI: 99 ML/MIN/1.73 M 2
GLOBULIN SER CALC-MCNC: 2.8 G/DL (ref 1.9–3.5)
GLUCOSE SERPL-MCNC: 75 MG/DL (ref 65–99)
HBA1C MFR BLD: 5.4 % (ref 4–5.6)
HCT VFR BLD AUTO: 45.9 % (ref 37–47)
HDLC SERPL-MCNC: 76 MG/DL
HGB BLD-MCNC: 15.2 G/DL (ref 12–16)
IMM GRANULOCYTES # BLD AUTO: 0.03 K/UL (ref 0–0.11)
IMM GRANULOCYTES NFR BLD AUTO: 0.4 % (ref 0–0.9)
LDLC SERPL CALC-MCNC: 187 MG/DL
LYMPHOCYTES # BLD AUTO: 3.36 K/UL (ref 1–4.8)
LYMPHOCYTES NFR BLD: 40.8 % (ref 22–41)
MCH RBC QN AUTO: 30.1 PG (ref 27–33)
MCHC RBC AUTO-ENTMCNC: 33.1 G/DL (ref 33.6–35)
MCV RBC AUTO: 90.9 FL (ref 81.4–97.8)
MONOCYTES # BLD AUTO: 0.56 K/UL (ref 0–0.85)
MONOCYTES NFR BLD AUTO: 6.8 % (ref 0–13.4)
NEUTROPHILS # BLD AUTO: 4.19 K/UL (ref 2–7.15)
NEUTROPHILS NFR BLD: 50.8 % (ref 44–72)
NRBC # BLD AUTO: 0 K/UL
NRBC BLD-RTO: 0 /100 WBC
PLATELET # BLD AUTO: 269 K/UL (ref 164–446)
PMV BLD AUTO: 10.2 FL (ref 9–12.9)
POTASSIUM SERPL-SCNC: 3.8 MMOL/L (ref 3.6–5.5)
PROT SERPL-MCNC: 7.5 G/DL (ref 6–8.2)
RBC # BLD AUTO: 5.05 M/UL (ref 4.2–5.4)
SODIUM SERPL-SCNC: 139 MMOL/L (ref 135–145)
TRIGL SERPL-MCNC: 100 MG/DL (ref 0–149)
TSH SERPL DL<=0.005 MIU/L-ACNC: 1.94 UIU/ML (ref 0.38–5.33)
WBC # BLD AUTO: 8.2 K/UL (ref 4.8–10.8)

## 2023-02-16 PROCEDURE — 80053 COMPREHEN METABOLIC PANEL: CPT

## 2023-02-16 PROCEDURE — 84443 ASSAY THYROID STIM HORMONE: CPT

## 2023-02-16 PROCEDURE — 80061 LIPID PANEL: CPT

## 2023-02-16 PROCEDURE — 83036 HEMOGLOBIN GLYCOSYLATED A1C: CPT

## 2023-02-16 PROCEDURE — 36415 COLL VENOUS BLD VENIPUNCTURE: CPT

## 2023-02-16 PROCEDURE — 85025 COMPLETE CBC W/AUTO DIFF WBC: CPT

## 2023-02-20 SDOH — HEALTH STABILITY: PHYSICAL HEALTH: ON AVERAGE, HOW MANY DAYS PER WEEK DO YOU ENGAGE IN MODERATE TO STRENUOUS EXERCISE (LIKE A BRISK WALK)?: 4 DAYS

## 2023-02-20 SDOH — ECONOMIC STABILITY: FOOD INSECURITY: WITHIN THE PAST 12 MONTHS, YOU WORRIED THAT YOUR FOOD WOULD RUN OUT BEFORE YOU GOT MONEY TO BUY MORE.: NEVER TRUE

## 2023-02-20 SDOH — ECONOMIC STABILITY: FOOD INSECURITY: WITHIN THE PAST 12 MONTHS, THE FOOD YOU BOUGHT JUST DIDN'T LAST AND YOU DIDN'T HAVE MONEY TO GET MORE.: NEVER TRUE

## 2023-02-20 SDOH — ECONOMIC STABILITY: INCOME INSECURITY: IN THE LAST 12 MONTHS, WAS THERE A TIME WHEN YOU WERE NOT ABLE TO PAY THE MORTGAGE OR RENT ON TIME?: NO

## 2023-02-20 SDOH — ECONOMIC STABILITY: TRANSPORTATION INSECURITY
IN THE PAST 12 MONTHS, HAS LACK OF TRANSPORTATION KEPT YOU FROM MEETINGS, WORK, OR FROM GETTING THINGS NEEDED FOR DAILY LIVING?: NO

## 2023-02-20 SDOH — HEALTH STABILITY: PHYSICAL HEALTH: ON AVERAGE, HOW MANY MINUTES DO YOU ENGAGE IN EXERCISE AT THIS LEVEL?: 40 MIN

## 2023-02-20 SDOH — ECONOMIC STABILITY: HOUSING INSECURITY

## 2023-02-20 SDOH — ECONOMIC STABILITY: INCOME INSECURITY: HOW HARD IS IT FOR YOU TO PAY FOR THE VERY BASICS LIKE FOOD, HOUSING, MEDICAL CARE, AND HEATING?: NOT HARD AT ALL

## 2023-02-20 ASSESSMENT — SOCIAL DETERMINANTS OF HEALTH (SDOH)
WITHIN THE PAST 12 MONTHS, YOU WORRIED THAT YOUR FOOD WOULD RUN OUT BEFORE YOU GOT THE MONEY TO BUY MORE: NEVER TRUE
HOW OFTEN DO YOU HAVE SIX OR MORE DRINKS ON ONE OCCASION: NEVER
IN A TYPICAL WEEK, HOW MANY TIMES DO YOU TALK ON THE PHONE WITH FAMILY, FRIENDS, OR NEIGHBORS?: TWICE A WEEK
IN A TYPICAL WEEK, HOW MANY TIMES DO YOU TALK ON THE PHONE WITH FAMILY, FRIENDS, OR NEIGHBORS?: TWICE A WEEK
DO YOU BELONG TO ANY CLUBS OR ORGANIZATIONS SUCH AS CHURCH GROUPS UNIONS, FRATERNAL OR ATHLETIC GROUPS, OR SCHOOL GROUPS?: YES
HOW OFTEN DO YOU GET TOGETHER WITH FRIENDS OR RELATIVES?: ONCE A WEEK
HOW OFTEN DO YOU ATTENT MEETINGS OF THE CLUB OR ORGANIZATION YOU BELONG TO?: MORE THAN 4 TIMES PER YEAR
HOW OFTEN DO YOU GET TOGETHER WITH FRIENDS OR RELATIVES?: ONCE A WEEK
DO YOU BELONG TO ANY CLUBS OR ORGANIZATIONS SUCH AS CHURCH GROUPS UNIONS, FRATERNAL OR ATHLETIC GROUPS, OR SCHOOL GROUPS?: YES
HOW OFTEN DO YOU ATTEND CHURCH OR RELIGIOUS SERVICES?: MORE THAN 4 TIMES PER YEAR
HOW OFTEN DO YOU ATTENT MEETINGS OF THE CLUB OR ORGANIZATION YOU BELONG TO?: MORE THAN 4 TIMES PER YEAR
HOW HARD IS IT FOR YOU TO PAY FOR THE VERY BASICS LIKE FOOD, HOUSING, MEDICAL CARE, AND HEATING?: NOT HARD AT ALL
HOW OFTEN DO YOU ATTEND CHURCH OR RELIGIOUS SERVICES?: MORE THAN 4 TIMES PER YEAR

## 2023-02-20 ASSESSMENT — LIFESTYLE VARIABLES: HOW OFTEN DO YOU HAVE SIX OR MORE DRINKS ON ONE OCCASION: NEVER

## 2023-02-22 ENCOUNTER — OFFICE VISIT (OUTPATIENT)
Dept: MEDICAL GROUP | Facility: LAB | Age: 63
End: 2023-02-22
Payer: COMMERCIAL

## 2023-02-22 VITALS
RESPIRATION RATE: 12 BRPM | SYSTOLIC BLOOD PRESSURE: 98 MMHG | BODY MASS INDEX: 21.82 KG/M2 | OXYGEN SATURATION: 97 % | WEIGHT: 144 LBS | TEMPERATURE: 96 F | HEART RATE: 72 BPM | HEIGHT: 68 IN | DIASTOLIC BLOOD PRESSURE: 64 MMHG

## 2023-02-22 DIAGNOSIS — E78.49 OTHER HYPERLIPIDEMIA: ICD-10-CM

## 2023-02-22 DIAGNOSIS — N95.2 VAGINAL ATROPHY: ICD-10-CM

## 2023-02-22 DIAGNOSIS — F17.200 SMOKER: ICD-10-CM

## 2023-02-22 DIAGNOSIS — Z13.820 SCREENING FOR OSTEOPOROSIS: ICD-10-CM

## 2023-02-22 DIAGNOSIS — Z78.9 STATIN INTOLERANCE: ICD-10-CM

## 2023-02-22 DIAGNOSIS — Z00.00 WELL ADULT EXAM: ICD-10-CM

## 2023-02-22 DIAGNOSIS — R73.01 IMPAIRED FASTING GLUCOSE: ICD-10-CM

## 2023-02-22 PROCEDURE — 99396 PREV VISIT EST AGE 40-64: CPT | Performed by: NURSE PRACTITIONER

## 2023-02-22 RX ORDER — ESTRADIOL 0.1 MG/G
1 CREAM VAGINAL DAILY
Qty: 42.5 G | Refills: 5 | Status: SHIPPED | OUTPATIENT
Start: 2023-02-22 | End: 2023-11-20 | Stop reason: SDUPTHER

## 2023-02-22 RX ORDER — VARENICLINE TARTRATE
KIT
Qty: 53 EACH | Refills: 0 | Status: SHIPPED | OUTPATIENT
Start: 2023-02-22 | End: 2023-08-17

## 2023-02-22 ASSESSMENT — PATIENT HEALTH QUESTIONNAIRE - PHQ9: CLINICAL INTERPRETATION OF PHQ2 SCORE: 0

## 2023-02-22 ASSESSMENT — FIBROSIS 4 INDEX: FIB4 SCORE: 1.3

## 2023-02-22 NOTE — PATIENT INSTRUCTIONS
Go to Chantix website and print out a coupon which could make chantix either free or very inexpensive.

## 2023-03-15 DIAGNOSIS — F51.01 PRIMARY INSOMNIA: ICD-10-CM

## 2023-03-15 RX ORDER — ALPRAZOLAM 1 MG/1
.5-1 TABLET ORAL NIGHTLY PRN
Qty: 30 TABLET | Refills: 2 | Status: SHIPPED | OUTPATIENT
Start: 2023-03-15 | End: 2023-04-17 | Stop reason: SDUPTHER

## 2023-03-15 NOTE — TELEPHONE ENCOUNTER
Received request via: PATIENT    Was the patient seen in the last year in this department? Yes  2/22/23  Does the patient have an active prescription (recently filled or refills available) for medication(s) requested? No    Does the patient have alf Plus and need 100 day supply (blood pressure, diabetes and cholesterol meds only)? Medication is not for cholesterol, blood pressure or diabetes

## 2023-03-22 ENCOUNTER — HOSPITAL ENCOUNTER (OUTPATIENT)
Dept: RADIOLOGY | Facility: MEDICAL CENTER | Age: 63
End: 2023-03-22
Attending: NURSE PRACTITIONER
Payer: COMMERCIAL

## 2023-03-22 DIAGNOSIS — Z13.820 SCREENING FOR OSTEOPOROSIS: ICD-10-CM

## 2023-03-22 PROCEDURE — 77080 DXA BONE DENSITY AXIAL: CPT

## 2023-04-17 DIAGNOSIS — F51.01 PRIMARY INSOMNIA: ICD-10-CM

## 2023-04-17 RX ORDER — ALPRAZOLAM 1 MG/1
.5-1 TABLET ORAL NIGHTLY PRN
Qty: 30 TABLET | Refills: 2 | Status: SHIPPED | OUTPATIENT
Start: 2023-04-17 | End: 2023-05-17

## 2023-04-17 NOTE — TELEPHONE ENCOUNTER
Received request via: Pharmacy  2/22/23lov  Was the patient seen in the last year in this department? Yes    Does the patient have an active prescription (recently filled or refills available) for medication(s) requested? No    Does the patient have alf Plus and need 100 day supply (blood pressure, diabetes and cholesterol meds only)? Patient does not have SCP

## 2023-04-20 ENCOUNTER — TELEPHONE (OUTPATIENT)
Dept: MEDICAL GROUP | Facility: LAB | Age: 63
End: 2023-04-20
Payer: COMMERCIAL

## 2023-04-20 DIAGNOSIS — F51.01 PRIMARY INSOMNIA: ICD-10-CM

## 2023-08-17 ENCOUNTER — OFFICE VISIT (OUTPATIENT)
Dept: MEDICAL GROUP | Facility: LAB | Age: 63
End: 2023-08-17
Payer: COMMERCIAL

## 2023-08-17 VITALS
RESPIRATION RATE: 12 BRPM | BODY MASS INDEX: 20.37 KG/M2 | TEMPERATURE: 96.6 F | WEIGHT: 134 LBS | SYSTOLIC BLOOD PRESSURE: 110 MMHG | OXYGEN SATURATION: 98 % | HEART RATE: 76 BPM | DIASTOLIC BLOOD PRESSURE: 60 MMHG

## 2023-08-17 DIAGNOSIS — G47.09 OTHER INSOMNIA: ICD-10-CM

## 2023-08-17 PROCEDURE — 99213 OFFICE O/P EST LOW 20 MIN: CPT | Performed by: NURSE PRACTITIONER

## 2023-08-17 PROCEDURE — 3074F SYST BP LT 130 MM HG: CPT | Performed by: NURSE PRACTITIONER

## 2023-08-17 PROCEDURE — 3078F DIAST BP <80 MM HG: CPT | Performed by: NURSE PRACTITIONER

## 2023-08-17 RX ORDER — QUETIAPINE FUMARATE 50 MG/1
50 TABLET, FILM COATED ORAL EVERY EVENING
Qty: 30 TABLET | Refills: 2 | Status: SHIPPED | OUTPATIENT
Start: 2023-08-17 | End: 2023-10-31

## 2023-08-17 ASSESSMENT — FIBROSIS 4 INDEX: FIB4 SCORE: 1.3

## 2023-08-17 NOTE — PROGRESS NOTES
"Chief Complaint   Patient presents with    Sleep Problem     HPI:  Beatriz is a 62 yo est female here with c/o:   #- sleep disturbance:  awake 27-48 hours at a time.  Feels tired but \"eyes won't close.\"   Doing yoga for exercise.  Mentions hands / feet are sweaty.  Having a few headaches per week.  Intolerant to trazodone - caused her to feel off balance / foggy.  Was taking xanax but cut this out slowly by tapering off - end of June. Stopped marijuana end of June also.  Tried tylenol pm without improvement.   Ambien causes sleep walking.     Social:   Work great.  3 yrs sober   passed away 2 yrs ago - July was his anniversary.    Exam:  /60 (BP Location: Right arm, Patient Position: Sitting, BP Cuff Size: Adult)   Pulse 76   Temp 35.9 °C (96.6 °F)   Resp 12   Wt 60.8 kg (134 lb)   SpO2 98%   Gen. appears healthy in no distress   Skin appropriate for sex and age   Neck trachea is midline  Lungs unlabored breathing  Heart regular rate  Neuro gait and station normal   Psych appropriate, calm, interactive, well-groomed    Assessment/plan:  \"  1. Other insomnia  QUEtiapine (SEROQUEL) 50 MG tablet      \"  Failed Ambien, trazodone and over-the-counter sleep medication.  Off benzodiazepine therapy and marijuana.  Trial of quetiapine 25 to 50 mg 45 minutes prior to bedtime, allowing 8 hours for sleep.  Discussed sleep hygiene.  She will send me a message on [employer] tomorrow regarding how she is doing with quetiapine.  We could consider amitriptyline or Belsomra if insurance will cover it if quetiapine does not work.  If she begins sleeping well for the next week but headaches persist, discussed CT of her brain.    Follow-up via [employer] 1 to 2 days.  "

## 2023-10-03 ENCOUNTER — APPOINTMENT (OUTPATIENT)
Dept: RADIOLOGY | Facility: MEDICAL CENTER | Age: 63
End: 2023-10-03
Attending: NURSE PRACTITIONER
Payer: COMMERCIAL

## 2023-10-03 DIAGNOSIS — Z00.00 WELL ADULT EXAM: ICD-10-CM

## 2023-10-03 PROCEDURE — 77063 BREAST TOMOSYNTHESIS BI: CPT

## 2023-10-31 DIAGNOSIS — G47.09 OTHER INSOMNIA: ICD-10-CM

## 2023-10-31 RX ORDER — QUETIAPINE FUMARATE 50 MG/1
TABLET, FILM COATED ORAL
Qty: 30 TABLET | Refills: 0 | Status: SHIPPED | OUTPATIENT
Start: 2023-10-31 | End: 2023-12-16 | Stop reason: SDUPTHER

## 2023-11-08 NOTE — PROGRESS NOTES
"Chief Complaint   Patient presents with    Annual Exam     Review labs       HPI:  Beatriz is a 64 yo est female here annual exam and for lab f/u:  Labs prior to arrival 2/16/2023 - normal cbc, a1c 5.4%, cmp, tsh, gfr.  , HDL 76, .  Did not have CT cardiac score done when ordered 3/2022.  Has no interest in a CT cardiac score, stating she does not want to know what this shows.    She did meet with Dr. Bloch, vascular medicine, in 2016.  Has had significant myalgias with multiple statins and refuses to take a statin.  Has no interest in Repatha.  Exercising / eating healthy.      Denies heart palpitations, bowel / bladder issues.   Saw a therapist at [facility] and this was helpful.     Has been smoking x 3 years but smoked intermittently before that.  4-5 cig per day.  Tried nicoderm gum which caused crown problems / dry mouth / tastes horrible.  She would like to quit smoking and is requesting help.    Considering becoming sexually active again,  passed away in 2021.  Is interested in vaginal estrogen to help with possible dryness and intercourse discomfort.      Exam:  BP 98/64 (BP Location: Left arm, Patient Position: Sitting, BP Cuff Size: Adult)   Pulse 72   Temp (!) 35.6 °C (96 °F)   Resp 12   Ht 1.727 m (5' 8\")   Wt 65.3 kg (144 lb)   SpO2 97%   Well developed, well nourished female in no apparent distress.  Eyes: Conjunctivae and sclerae are clear and non-icteric. Pupils are equally round and reactive to light, extra-ocular movements intact.   ENT: Nares are patent and without discharge. Oropharynx is clear and without erythema or exudates. Buccal mucosa is moist.  Neck: Supple; there is no adenopathy. No supraclavicular adenopathy is noted.  CV: Heart is regular without murmur, rub or gallop.  Pulm: Clear to auscultation.  GI: Abdomen is soft, non-tender,  with normoactive bowel sounds in all four quadrants. No hepatosplenomegaly is appreciated. No masses are palpated. No " Constitutional:  energy level is good.  Skin: no skin rashes or lesions of concern, Did recently have 3 spots removed from face, all were negative.  HEENT:   no headache, visual difficulty, or hearing difficulty, no dental problems.  Cardiovascular:   no chest pain or palpitations.  Respiratory:   no cough or shortness of breath.  Gastrointestinal:   no abdominal pain or change in bowel movements, no dysphagia, nausea or vomiting.  Genitourinary:   no urinary difficulty.  Musculoskeletal:  no joint pain or swelling. Right foot swelling, was seen in UC and give prednisone, would like this checked.  Neurologic: no headache.  Hematologic:  no known anemia or bleeding disorder.  Endocrine:  no polyuria, polydipsia, no heat or cold intolerance.    Recent PHQ 2/9 Score    PHQ 2:  PHQ 2 Score Adult PHQ 2 Score Adult PHQ 2 Interpretation Little interest or pleasure in activity?   11/8/2023   1:10 PM 0 No further screening needed 0       PHQ 9:  PHQ 9 Score Adult PHQ 9 Score Adult PHQ 9 Interpretation   11/8/2023   1:10 PM 1 Minimal Depression     PHQ-2/9 Depression Screening  Little interest or pleasure in activity?: Not at all  Feeling down, depressed or hopeless?: Not at all  Initial depression screening score:: 0  PHQ2 Interpretation: No further screening needed  Trouble falling or staying asleep or sleeping all the time?: Not at all  Feeling tired or having little energy?: Several days  Poor appetite or overeating?: Not at all  Feeling bad about yourself or that you are a failure or have let yourself or family down?: Not at all  Trouble concentrating on things such as reading the newspaper or watching TV?: Not at all  Moving or speaking slowly that other people have noticed or the opposite - being so fidgety or restless that you have been moving around a lot more than usual?: Not at all  Thoughts that you would be better off dead or of hurting yourself in some way?: Not at all  Total depressive symptoms score (PHQ9): :  1  PHQ9 Interpretation: Minimal Depression   Generalized Anxiety Disorder (GAD7)    Over the last 2 weeks, how often have you been bothered by the following problems?   Score: 1    Score:  0-4 minimal symptoms 10-14 moderate symptoms   5-9 mild symptoms 15-21 severe symptoms      1.  Feeling nervous, anxious or on edge Not at all   2.  Not being able to stop or control worrying Not at all   3.  Worrying too much about different things Not at all   4.  Trouble relaxing Not at all   5.  Being so restless that it's hard to sit still Not at all   6.  Becoming easily annoyed or irritable Several days   7.  Feeling afraid as if something awful might happen Not at all            If you checked off any problems, how difficult have these made it for you to do your work, take care of things at home, or get along with other people? Not difficult at all        Verbal permission obtained from patient evangelista Flores to use Scribble to record office visit today.      guarding or rebound is noted.  Psychiatric: The patient is alert and oriented in all four spheres. Mood is euthymic. Affect is appropriate for the situation.  Skin: No rashes were noted.  Musculoskeletal: Gait is normal. Patient is able to transfer from sitting position to exam table without assistance.      A/P:    1. Well adult exam  MA-SCREENING MAMMO BILAT W/TOMOSYNTHESIS W/CAD      2. Impaired fasting glucose        3. Other hyperlipidemia        4. Vaginal atrophy  estradiol (ESTRACE) 0.1 MG/GM vaginal cream      5. Statin intolerance        6. Smoker  Varenicline Tartrate, Starter, (CHANTIX STARTING MONTH PAK) 0.5 MG X 11 & 1 MG X 42 Tablet Therapy Pack      7. Screening for osteoporosis  DS-BONE DENSITY STUDY (DEXA)      Overall feeling really well and states that life is good.    Reviewed all labs.  Reviewed vascular medicine consult from 2016.  Declines CT cardiac score or any treatment for what is likely familial hyperlipidemia.  Encouraged her certainly to quit smoking which will decrease her risk of a coronary event.  Trial of Chantix if covered by insurance.  Discussed how to take Chantix as well as potential side effects.  She will let me know when she is ready for Chantix pack #2/continuing pack.  Recommend updated mammogram and bone density.  Colonoscopy is up-to-date.  She has no interest in any further COVID or flu vaccines.  Encouraged her to continue with regular physical exercise, at least 30 minutes, 5 days/week along with a high-fiber, lean protein, plant-based diet.  Trial of vaginal estrogen nightly for 1 week and then maintenance vaginal estrogen twice weekly which should help with lubrication and elasticity.  She will let me know if vaginal estrogen is not helpful.    Anticipatory guidance:  Encouraged daily physical exercise, high fiber / vegetable based diet, 8 hours of sleep at night, skin protection from sun with suncreen / clothing, yearly derm consults.  Discussed STD prevention.

## 2023-11-20 ENCOUNTER — PATIENT MESSAGE (OUTPATIENT)
Dept: MEDICAL GROUP | Facility: LAB | Age: 63
End: 2023-11-20
Payer: COMMERCIAL

## 2023-11-20 DIAGNOSIS — N95.2 VAGINAL ATROPHY: ICD-10-CM

## 2023-11-21 RX ORDER — ESTRADIOL 0.1 MG/G
1 CREAM VAGINAL
Qty: 30 EACH | Refills: 3 | Status: SHIPPED | OUTPATIENT
Start: 2023-11-21 | End: 2023-12-21

## 2023-12-16 DIAGNOSIS — G47.09 OTHER INSOMNIA: ICD-10-CM

## 2023-12-18 RX ORDER — QUETIAPINE FUMARATE 50 MG/1
TABLET, FILM COATED ORAL
Qty: 30 TABLET | Refills: 1 | Status: SHIPPED | OUTPATIENT
Start: 2023-12-18 | End: 2024-02-12

## 2024-02-10 DIAGNOSIS — G47.09 OTHER INSOMNIA: ICD-10-CM

## 2024-02-12 RX ORDER — QUETIAPINE FUMARATE 50 MG/1
TABLET, FILM COATED ORAL
Qty: 30 TABLET | Refills: 0 | Status: SHIPPED | OUTPATIENT
Start: 2024-02-12 | End: 2024-03-11

## 2024-03-09 DIAGNOSIS — G47.09 OTHER INSOMNIA: ICD-10-CM

## 2024-03-11 RX ORDER — QUETIAPINE FUMARATE 50 MG/1
TABLET, FILM COATED ORAL
Qty: 30 TABLET | Refills: 0 | Status: SHIPPED | OUTPATIENT
Start: 2024-03-11

## 2024-04-04 ENCOUNTER — APPOINTMENT (OUTPATIENT)
Dept: MEDICAL GROUP | Facility: LAB | Age: 64
End: 2024-04-04
Payer: COMMERCIAL

## 2024-04-08 ENCOUNTER — PATIENT MESSAGE (OUTPATIENT)
Dept: MEDICAL GROUP | Facility: LAB | Age: 64
End: 2024-04-08
Payer: COMMERCIAL

## 2024-04-08 DIAGNOSIS — G47.09 OTHER INSOMNIA: ICD-10-CM

## 2024-04-08 RX ORDER — QUETIAPINE FUMARATE 50 MG/1
TABLET, FILM COATED ORAL
Qty: 30 TABLET | Refills: 5 | Status: SHIPPED | OUTPATIENT
Start: 2024-04-08

## 2024-06-06 ENCOUNTER — APPOINTMENT (OUTPATIENT)
Dept: MEDICAL GROUP | Facility: LAB | Age: 64
End: 2024-06-06
Payer: COMMERCIAL

## 2024-06-13 ENCOUNTER — APPOINTMENT (OUTPATIENT)
Dept: MEDICAL GROUP | Facility: LAB | Age: 64
End: 2024-06-13
Payer: COMMERCIAL

## 2024-07-02 ENCOUNTER — APPOINTMENT (RX ONLY)
Dept: URBAN - METROPOLITAN AREA CLINIC 15 | Facility: CLINIC | Age: 64
Setting detail: DERMATOLOGY
End: 2024-07-02

## 2024-07-02 PROBLEM — D48.5 NEOPLASM OF UNCERTAIN BEHAVIOR OF SKIN: Status: ACTIVE | Noted: 2024-07-02

## 2024-07-02 PROCEDURE — ? BIOPSY BY SHAVE METHOD

## 2024-07-02 PROCEDURE — 11102 TANGNTL BX SKIN SINGLE LES: CPT

## 2024-09-30 DIAGNOSIS — G47.09 OTHER INSOMNIA: ICD-10-CM

## 2024-09-30 RX ORDER — QUETIAPINE FUMARATE 50 MG/1
TABLET, FILM COATED ORAL
Qty: 30 TABLET | Refills: 0 | Status: SHIPPED | OUTPATIENT
Start: 2024-09-30

## 2024-10-07 ENCOUNTER — APPOINTMENT (RX ONLY)
Dept: URBAN - METROPOLITAN AREA CLINIC 15 | Facility: CLINIC | Age: 64
Setting detail: DERMATOLOGY
End: 2024-10-07

## 2024-11-12 ENCOUNTER — APPOINTMENT (OUTPATIENT)
Dept: MEDICAL GROUP | Facility: LAB | Age: 64
End: 2024-11-12
Payer: COMMERCIAL

## 2024-11-15 DIAGNOSIS — G47.09 OTHER INSOMNIA: ICD-10-CM

## 2024-11-15 NOTE — TELEPHONE ENCOUNTER
Received request via: Pharmacy    Was the patient seen in the last year in this department? No8/2023    Does the patient have an active prescription (recently filled or refills available) for medication(s) requested? No    Pharmacy Name: [facility]    Does the patient have FCI Plus and need 100-day supply? (This applies to ALL medications) Patient does not have SCP

## 2024-11-17 RX ORDER — QUETIAPINE FUMARATE 50 MG/1
TABLET, FILM COATED ORAL
Qty: 30 TABLET | Refills: 5 | Status: SHIPPED | OUTPATIENT
Start: 2024-11-17

## 2025-01-21 NOTE — PROGRESS NOTES
Trauma / Surgical Daily Progress Note    Date of Service  11/12/2018    Chief Complaint  58 y.o. female admitted 11/11/2018 with Trauma  Hospital day #2    Interval Events  No overnight events  Muscle spasms in groin area - muscle relaxer.   PT therapy to see pt today, Walker ordered.  Tertiary survey completed, with no further findings.   RAP/SBIRT completed and documented in EPIC.    Review of Systems  Review of Systems   Constitutional: Negative for fever.   HENT: Negative for hearing loss.    Eyes: Negative for double vision.   Respiratory: Positive for shortness of breath.         Two liters 96%   Cardiovascular: Positive for chest pain.        Rib fractures   Gastrointestinal: Negative for abdominal pain and nausea.   Genitourinary:        Voiding     Musculoskeletal: Positive for joint pain and myalgias.        Right hip pain   Skin: Negative for rash.   Neurological: Negative for sensory change.   Psychiatric/Behavioral: Negative for substance abuse.        Negative cage        Vital Signs  Temp:  [36.3 °C (97.3 °F)-37.2 °C (99 °F)] 37 °C (98.6 °F)  Pulse:  [53-87] 62  Resp:  [16-18] 16  BP: ()/(48-88) 96/48    Physical Exam  Physical Exam   Constitutional: She is oriented to person, place, and time. She appears well-nourished.   HENT:   Head: Atraumatic.   Eyes: Pupils are equal, round, and reactive to light.   Neck: Normal range of motion.   Cardiovascular: Normal rate.    Pulmonary/Chest: Effort normal.   Abdominal: Soft.   Musculoskeletal: She exhibits edema and tenderness.   Right hip   Neurological: She is alert and oriented to person, place, and time.   Skin: Skin is warm.   Psychiatric: Her behavior is normal.       Laboratory  Recent Results (from the past 24 hour(s))   ABO AND RH CONFIRMATION    Collection Time: 11/11/18 10:13 AM   Result Value Ref Range    ABO Confirm A     Second Rh Group POS    CBC with Differential: Tomorrow AM    Collection Time: 11/12/18  3:12 AM   Result Value Ref  Range    WBC 6.6 4.8 - 10.8 K/uL    RBC 3.93 (L) 4.20 - 5.40 M/uL    Hemoglobin 12.7 12.0 - 16.0 g/dL    Hematocrit 38.6 37.0 - 47.0 %    MCV 98.2 (H) 81.4 - 97.8 fL    MCH 32.3 27.0 - 33.0 pg    MCHC 32.9 (L) 33.6 - 35.0 g/dL    RDW 39.5 35.9 - 50.0 fL    Platelet Count 157 (L) 164 - 446 K/uL    MPV 10.3 9.0 - 12.9 fL    Neutrophils-Polys 62.60 44.00 - 72.00 %    Lymphocytes 26.80 22.00 - 41.00 %    Monocytes 9.00 0.00 - 13.40 %    Eosinophils 0.80 0.00 - 6.90 %    Basophils 0.50 0.00 - 1.80 %    Immature Granulocytes 0.30 0.00 - 0.90 %    Nucleated RBC 0.00 /100 WBC    Neutrophils (Absolute) 4.16 2.00 - 7.15 K/uL    Lymphs (Absolute) 1.78 1.00 - 4.80 K/uL    Monos (Absolute) 0.60 0.00 - 0.85 K/uL    Eos (Absolute) 0.05 0.00 - 0.51 K/uL    Baso (Absolute) 0.03 0.00 - 0.12 K/uL    Immature Granulocytes (abs) 0.02 0.00 - 0.11 K/uL    NRBC (Absolute) 0.00 K/uL   Comp Metabolic Panel (CMP): Tomorrow AM    Collection Time: 11/12/18  3:12 AM   Result Value Ref Range    Sodium 139 135 - 145 mmol/L    Potassium 3.7 3.6 - 5.5 mmol/L    Chloride 104 96 - 112 mmol/L    Co2 30 20 - 33 mmol/L    Anion Gap 5.0 0.0 - 11.9    Glucose 123 (H) 65 - 99 mg/dL    Bun 9 8 - 22 mg/dL    Creatinine 0.73 0.50 - 1.40 mg/dL    Calcium 8.7 8.5 - 10.5 mg/dL    AST(SGOT) 33 12 - 45 U/L    ALT(SGPT) 59 (H) 2 - 50 U/L    Alkaline Phosphatase 48 30 - 99 U/L    Total Bilirubin 0.9 0.1 - 1.5 mg/dL    Albumin 3.2 3.2 - 4.9 g/dL    Total Protein 5.5 (L) 6.0 - 8.2 g/dL    Globulin 2.3 1.9 - 3.5 g/dL    A-G Ratio 1.4 g/dL   Magnesium: Every Monday and Thursday AM    Collection Time: 11/12/18  3:12 AM   Result Value Ref Range    Magnesium 1.9 1.5 - 2.5 mg/dL   Phosphorus: Every Monday and Thursday AM    Collection Time: 11/12/18  3:12 AM   Result Value Ref Range    Phosphorus 2.9 2.5 - 4.5 mg/dL   ESTIMATED GFR    Collection Time: 11/12/18  3:12 AM   Result Value Ref Range    GFR If African American >60 >60 mL/min/1.73 m 2    GFR If Non African American  >60 >60 mL/min/1.73 m 2       Fluids    Intake/Output Summary (Last 24 hours) at 11/12/18 0839  Last data filed at 11/12/18 0400   Gross per 24 hour   Intake          2591.23 ml   Output              400 ml   Net          2191.23 ml       Core Measures & Quality Metrics  Labs reviewed and Medications reviewed  Barrera catheter: No Barrera      DVT Prophylaxis: Enoxaparin (Lovenox)    Ulcer prophylaxis: Not indicated    Assessed for rehab: Patient returned to prior level of function, rehabilitation not indicated at this time    Total Score: 6    ETOH Screening  CAGE Score: 0  Intervention complete date: 11/12/2018  Patient response to intervention: negative cage.   Total ETOH intervention time: 15 - 30 mintues      Assessment/Plan  Fracture of multiple ribs of right side- (present on admission)   Assessment & Plan    Fractures of right ribs 3 through 6.  Aggressive multimodal pain management, and pulmonary hygiene.        Acute pelvic pain- (present on admission)   Assessment & Plan    Complains of significant right pelvic pain.  Xrays at sending facility clear of acute injury.  11/11 CT of pelvis no acute findngs. Diagnostic imaging right femur negative for fracture.  CT pelvis Sclerotic lesions in the LEFT ischium and RIGHT iliac bone has a somewhat atypical appearance for bone islands. Consider bone scan to assess for the possibility of metastatic disease if clinically appropriate.     Pneumothorax, traumatic- (present on admission)   Assessment & Plan    11/12 CXR Tiny right pneumothorax, not visulized.  No chest tube required at this time.  Supplemental oxygen to maintain oxygenation >95%. Aggressive pulmonary hygiene.        Bone lesion- (present on admission)   Assessment & Plan    Sclerotic lesions in the left ischium and right iliac bone has a somewhat atypical appearance for bone islands. Consider bone scan to assess for the possibility of metastatic disease if clinically appropriate.  Follow up outpatient.    [FreeTextEntry1] : 24 y.o. female with h/o anxiety disorder and h/o elevated TSH level presents for follow up visit   She graduated from [employer] in May 2024 for statistics and looking for a job.   She denies prior history of thyroid disease. She does have a family history of thyroid disease which includes her mother with h/o Graves' disease and then hypothyroidism. Also her paternal grandmother has thyroid disease.   She reports increase in sweating overnight. Also reports weight gain of 50 pounds over 2 1/2 years now. C/o fatigue. Also report poor sleep but that is always an issue. She does report variable bowel movements sometimes. Reports hair loss. Reports increase hair in forehead area. Does reports acne but managing with dermatologist. Does exercise 5 times per week and walking.   Also restarted Zoloft 12.5 mg daily and also taking Wellbutrin for anxiety. Tried Lexapro in the past. She works with a therapist every 2 weeks and a psychiatrist every month or so.   No neck complaints including dysphagia, dysphonia or neck pain.   Lab work from 6/9/22 showed an elevated TSH of 5.26 and normal Free T4 of 1.3.   Reports cramping and breakthrough bleeding and saw GYN. Discussed possibility of endometriosis and PCOS. She saw 3rd GYN.  Taking Junel-Fe since 12th grade of high school.   She did have vaginal ultrasound, but no cystic ovaries and reports change in uterus shape.   She was recommended Metformin so back to our office for management.    Pulmonary nodule- (present on admission)   Assessment & Plan    Incidental finding 5 mm right middle lobe pulmonary nodule  Follow up outpatient       Trauma- (present on admission)   Assessment & Plan    Mechanical fall at home on 11/10. To [facility] ED for evaluation for increasing rib cage pain and shortness of breath.  Trauma Green Transfer Activation.   Hank Johnson MD. Trauma Surgery.         Discussed patient condition with Family, RN, Patient and trauma surgery. Dr. Johnson    Seen on rounds  Clinically improving  Continue therapies  Home when sufficiently mobile  Discussed with pt and Scot Johnson MD

## 2025-03-07 ENCOUNTER — PATIENT MESSAGE (OUTPATIENT)
Dept: MEDICAL GROUP | Facility: LAB | Age: 65
End: 2025-03-07
Payer: COMMERCIAL

## 2025-03-07 DIAGNOSIS — Z00.00 PREVENTATIVE HEALTH CARE: ICD-10-CM

## 2025-03-10 ENCOUNTER — APPOINTMENT (OUTPATIENT)
Dept: RADIOLOGY | Facility: MEDICAL CENTER | Age: 65
End: 2025-03-10
Attending: NURSE PRACTITIONER
Payer: COMMERCIAL

## 2025-03-10 ENCOUNTER — RESULTS FOLLOW-UP (OUTPATIENT)
Dept: MEDICAL GROUP | Facility: LAB | Age: 65
End: 2025-03-10

## 2025-03-10 DIAGNOSIS — Z12.31 VISIT FOR SCREENING MAMMOGRAM: ICD-10-CM

## 2025-03-10 PROCEDURE — 77067 SCR MAMMO BI INCL CAD: CPT

## 2025-03-22 LAB
25(OH)D3+25(OH)D2 SERPL-MCNC: 34.4 NG/ML (ref 30–100)
ALBUMIN SERPL-MCNC: 4.4 G/DL (ref 3.9–4.9)
ALP SERPL-CCNC: 63 IU/L (ref 44–121)
ALT SERPL-CCNC: 22 IU/L (ref 0–32)
AST SERPL-CCNC: 21 IU/L (ref 0–40)
BASOPHILS # BLD AUTO: 0 X10E3/UL (ref 0–0.2)
BASOPHILS NFR BLD AUTO: 1 %
BILIRUB SERPL-MCNC: 0.6 MG/DL (ref 0–1.2)
BUN SERPL-MCNC: 15 MG/DL (ref 8–27)
BUN/CREAT SERPL: 20 (ref 12–28)
CALCIUM SERPL-MCNC: 9.9 MG/DL (ref 8.7–10.3)
CHLORIDE SERPL-SCNC: 102 MMOL/L (ref 96–106)
CHOLEST SERPL-MCNC: 284 MG/DL (ref 100–199)
CO2 SERPL-SCNC: 24 MMOL/L (ref 20–29)
CREAT SERPL-MCNC: 0.74 MG/DL (ref 0.57–1)
EGFRCR SERPLBLD CKD-EPI 2021: 91 ML/MIN/1.73
EOSINOPHIL # BLD AUTO: 0 X10E3/UL (ref 0–0.4)
EOSINOPHIL NFR BLD AUTO: 1 %
ERYTHROCYTE [DISTWIDTH] IN BLOOD BY AUTOMATED COUNT: 11.7 % (ref 11.7–15.4)
GLOBULIN SER CALC-MCNC: 2.9 G/DL (ref 1.5–4.5)
GLUCOSE SERPL-MCNC: 103 MG/DL (ref 70–99)
HBA1C MFR BLD: 5.5 % (ref 4.8–5.6)
HCT VFR BLD AUTO: 47.3 % (ref 34–46.6)
HDLC SERPL-MCNC: 59 MG/DL
HGB BLD-MCNC: 15.8 G/DL (ref 11.1–15.9)
IMM GRANULOCYTES # BLD AUTO: 0 X10E3/UL (ref 0–0.1)
IMM GRANULOCYTES NFR BLD AUTO: 0 %
IMMATURE CELLS  115398: ABNORMAL
LDL CALC COMMENT:: ABNORMAL
LDLC SERPL CALC-MCNC: 199 MG/DL (ref 0–99)
LYMPHOCYTES # BLD AUTO: 2.4 X10E3/UL (ref 0.7–3.1)
LYMPHOCYTES NFR BLD AUTO: 37 %
MCH RBC QN AUTO: 31.8 PG (ref 26.6–33)
MCHC RBC AUTO-ENTMCNC: 33.4 G/DL (ref 31.5–35.7)
MCV RBC AUTO: 95 FL (ref 79–97)
MONOCYTES # BLD AUTO: 0.4 X10E3/UL (ref 0.1–0.9)
MONOCYTES NFR BLD AUTO: 7 %
MORPHOLOGY BLD-IMP: ABNORMAL
NEUTROPHILS # BLD AUTO: 3.5 X10E3/UL (ref 1.4–7)
NEUTROPHILS NFR BLD AUTO: 54 %
NRBC BLD AUTO-RTO: ABNORMAL %
PLATELET # BLD AUTO: 305 X10E3/UL (ref 150–450)
POTASSIUM SERPL-SCNC: 4.8 MMOL/L (ref 3.5–5.2)
PROT SERPL-MCNC: 7.3 G/DL (ref 6–8.5)
RBC # BLD AUTO: 4.97 X10E6/UL (ref 3.77–5.28)
SODIUM SERPL-SCNC: 142 MMOL/L (ref 134–144)
TRIGL SERPL-MCNC: 144 MG/DL (ref 0–149)
TSH SERPL DL<=0.005 MIU/L-ACNC: 2.08 UIU/ML (ref 0.45–4.5)
VLDLC SERPL CALC-MCNC: 26 MG/DL (ref 5–40)
WBC # BLD AUTO: 6.4 X10E3/UL (ref 3.4–10.8)

## 2025-03-23 ENCOUNTER — RESULTS FOLLOW-UP (OUTPATIENT)
Dept: MEDICAL GROUP | Facility: LAB | Age: 65
End: 2025-03-23

## 2025-03-24 SDOH — HEALTH STABILITY: PHYSICAL HEALTH: ON AVERAGE, HOW MANY DAYS PER WEEK DO YOU ENGAGE IN MODERATE TO STRENUOUS EXERCISE (LIKE A BRISK WALK)?: 5 DAYS

## 2025-03-24 SDOH — ECONOMIC STABILITY: INCOME INSECURITY: HOW HARD IS IT FOR YOU TO PAY FOR THE VERY BASICS LIKE FOOD, HOUSING, MEDICAL CARE, AND HEATING?: NOT VERY HARD

## 2025-03-24 SDOH — HEALTH STABILITY: PHYSICAL HEALTH: ON AVERAGE, HOW MANY MINUTES DO YOU ENGAGE IN EXERCISE AT THIS LEVEL?: 60 MIN

## 2025-03-24 SDOH — ECONOMIC STABILITY: FOOD INSECURITY: WITHIN THE PAST 12 MONTHS, THE FOOD YOU BOUGHT JUST DIDN'T LAST AND YOU DIDN'T HAVE MONEY TO GET MORE.: NEVER TRUE

## 2025-03-24 SDOH — ECONOMIC STABILITY: INCOME INSECURITY: IN THE LAST 12 MONTHS, WAS THERE A TIME WHEN YOU WERE NOT ABLE TO PAY THE MORTGAGE OR RENT ON TIME?: NO

## 2025-03-24 SDOH — ECONOMIC STABILITY: FOOD INSECURITY: WITHIN THE PAST 12 MONTHS, YOU WORRIED THAT YOUR FOOD WOULD RUN OUT BEFORE YOU GOT MONEY TO BUY MORE.: NEVER TRUE

## 2025-03-24 SDOH — HEALTH STABILITY: MENTAL HEALTH
STRESS IS WHEN SOMEONE FEELS TENSE, NERVOUS, ANXIOUS, OR CAN'T SLEEP AT NIGHT BECAUSE THEIR MIND IS TROUBLED. HOW STRESSED ARE YOU?: ONLY A LITTLE

## 2025-03-24 ASSESSMENT — SOCIAL DETERMINANTS OF HEALTH (SDOH)
HOW OFTEN DO YOU GET TOGETHER WITH FRIENDS OR RELATIVES?: ONCE A WEEK
IN THE PAST 12 MONTHS, HAS THE ELECTRIC, GAS, OIL, OR WATER COMPANY THREATENED TO SHUT OFF SERVICE IN YOUR HOME?: NO
HOW OFTEN DO YOU ATTEND CHURCH OR RELIGIOUS SERVICES?: MORE THAN 4 TIMES PER YEAR
HOW MANY DRINKS CONTAINING ALCOHOL DO YOU HAVE ON A TYPICAL DAY WHEN YOU ARE DRINKING: 3 OR 4
HOW OFTEN DO YOU ATTEND CHURCH OR RELIGIOUS SERVICES?: MORE THAN 4 TIMES PER YEAR
DO YOU BELONG TO ANY CLUBS OR ORGANIZATIONS SUCH AS CHURCH GROUPS UNIONS, FRATERNAL OR ATHLETIC GROUPS, OR SCHOOL GROUPS?: YES
HOW OFTEN DO YOU ATTENT MEETINGS OF THE CLUB OR ORGANIZATION YOU BELONG TO?: MORE THAN 4 TIMES PER YEAR
DO YOU BELONG TO ANY CLUBS OR ORGANIZATIONS SUCH AS CHURCH GROUPS UNIONS, FRATERNAL OR ATHLETIC GROUPS, OR SCHOOL GROUPS?: YES
HOW OFTEN DO YOU GET TOGETHER WITH FRIENDS OR RELATIVES?: ONCE A WEEK
HOW OFTEN DO YOU HAVE A DRINK CONTAINING ALCOHOL: 2-4 TIMES A MONTH
HOW HARD IS IT FOR YOU TO PAY FOR THE VERY BASICS LIKE FOOD, HOUSING, MEDICAL CARE, AND HEATING?: NOT VERY HARD
HOW OFTEN DO YOU ATTENT MEETINGS OF THE CLUB OR ORGANIZATION YOU BELONG TO?: MORE THAN 4 TIMES PER YEAR
IN A TYPICAL WEEK, HOW MANY TIMES DO YOU TALK ON THE PHONE WITH FAMILY, FRIENDS, OR NEIGHBORS?: ONCE A WEEK
HOW OFTEN DO YOU HAVE SIX OR MORE DRINKS ON ONE OCCASION: NEVER
IN A TYPICAL WEEK, HOW MANY TIMES DO YOU TALK ON THE PHONE WITH FAMILY, FRIENDS, OR NEIGHBORS?: ONCE A WEEK
WITHIN THE PAST 12 MONTHS, YOU WORRIED THAT YOUR FOOD WOULD RUN OUT BEFORE YOU GOT THE MONEY TO BUY MORE: NEVER TRUE

## 2025-03-24 ASSESSMENT — LIFESTYLE VARIABLES
HOW MANY STANDARD DRINKS CONTAINING ALCOHOL DO YOU HAVE ON A TYPICAL DAY: 3 OR 4
SKIP TO QUESTIONS 9-10: 0
AUDIT-C TOTAL SCORE: 3
HOW OFTEN DO YOU HAVE SIX OR MORE DRINKS ON ONE OCCASION: NEVER
HOW OFTEN DO YOU HAVE A DRINK CONTAINING ALCOHOL: 2-4 TIMES A MONTH

## 2025-03-26 ENCOUNTER — OFFICE VISIT (OUTPATIENT)
Dept: MEDICAL GROUP | Facility: LAB | Age: 65
End: 2025-03-26
Payer: COMMERCIAL

## 2025-03-26 ENCOUNTER — HOSPITAL ENCOUNTER (OUTPATIENT)
Facility: MEDICAL CENTER | Age: 65
End: 2025-03-26
Attending: NURSE PRACTITIONER
Payer: COMMERCIAL

## 2025-03-26 VITALS
DIASTOLIC BLOOD PRESSURE: 80 MMHG | BODY MASS INDEX: 21.98 KG/M2 | HEIGHT: 68 IN | RESPIRATION RATE: 12 BRPM | SYSTOLIC BLOOD PRESSURE: 110 MMHG | WEIGHT: 145 LBS | HEART RATE: 82 BPM | OXYGEN SATURATION: 98 % | TEMPERATURE: 97.2 F

## 2025-03-26 DIAGNOSIS — E78.49 OTHER HYPERLIPIDEMIA: ICD-10-CM

## 2025-03-26 DIAGNOSIS — G47.09 OTHER INSOMNIA: ICD-10-CM

## 2025-03-26 DIAGNOSIS — Z12.4 SCREENING FOR MALIGNANT NEOPLASM OF CERVIX: ICD-10-CM

## 2025-03-26 DIAGNOSIS — R51.9 NEW ONSET OF HEADACHES: ICD-10-CM

## 2025-03-26 DIAGNOSIS — Z00.00 WELL ADULT EXAM: ICD-10-CM

## 2025-03-26 DIAGNOSIS — F17.200 SMOKER: ICD-10-CM

## 2025-03-26 PROCEDURE — 3074F SYST BP LT 130 MM HG: CPT | Performed by: NURSE PRACTITIONER

## 2025-03-26 PROCEDURE — 88142 CYTOPATH C/V THIN LAYER: CPT

## 2025-03-26 PROCEDURE — 3079F DIAST BP 80-89 MM HG: CPT | Performed by: NURSE PRACTITIONER

## 2025-03-26 PROCEDURE — 99396 PREV VISIT EST AGE 40-64: CPT | Performed by: NURSE PRACTITIONER

## 2025-03-26 RX ORDER — NICOTINE 21 MG/24HR
1 PATCH, TRANSDERMAL 24 HOURS TRANSDERMAL EVERY 24 HOURS
Qty: 28 PATCH | Refills: 3 | Status: SHIPPED | OUTPATIENT
Start: 2025-03-26

## 2025-03-26 RX ORDER — QUETIAPINE FUMARATE 50 MG/1
TABLET, FILM COATED ORAL
Qty: 30 TABLET | Refills: 5 | Status: SHIPPED | OUTPATIENT
Start: 2025-03-26

## 2025-03-26 ASSESSMENT — PATIENT HEALTH QUESTIONNAIRE - PHQ9: CLINICAL INTERPRETATION OF PHQ2 SCORE: 0

## 2025-03-26 ASSESSMENT — FIBROSIS 4 INDEX: FIB4 SCORE: 0.92

## 2025-03-27 ENCOUNTER — APPOINTMENT (OUTPATIENT)
Dept: MEDICAL GROUP | Facility: LAB | Age: 65
End: 2025-03-27
Payer: COMMERCIAL

## 2025-03-27 NOTE — PROGRESS NOTES
Chief Complaint   Patient presents with    Annual Exam     Verbal consent was acquired by the patient to use [employer] ambient listening note generation during this visit Yes     History of Present Illness  The patient is a 65-year-old established female who presents for an annual exam. She was last seen in 08/2023. She did labs prior to arrival. Pap smear was done in 2022, bone density in 2023 with repeat in 5 years, colonoscopy in 2020 with repeat in 10 years. Tdap is up to date but due in 09/2025. Mammogram is up to date. Lipid panel showed an LDL of 199, total 284. Otherwise, labs were normal. She has not had a CT cardiac score. She did have a CT of her chest in 2018 that did not comment regarding any atherosclerosis.  She is intolerant to multiple statins.    She has been experiencing headaches since 12/2024, occurring approximately twice weekly, sometimes on consecutive days. The headaches are intermittent, subsiding after a few hours before returning. She describes the pain as radiating from the right side of her head to her eye and down her neck. The intensity varies, with some episodes being dull while others are severe enough to induce nausea. She reports no history of migraines during her teenage years. She is unemployed, she does not report significant stress. She also reports no sinus infection or congestion. She retains all neurological functions, including typing, writing, speaking, driving, seeing, and tasting. She expresses concern about a potential brain tumor due to a past incident in 2018 where she fainted in a grocery store and hit her head.    She has been struggling with smoking cessation since her 's death, currently consuming half a pack daily. She has tried Nicorette gum without success and has not previously used NicoDerm patches. She has not taken Chantix, Wellbutrin, or Effexor.    She has been experiencing weight gain over the past 6 months due to reduced physical activity in  cold weather. She typically enjoys walking for an hour and a half daily but has been unable to do so recently. She resumed walking last week and has already lost 5 pounds.    She has a history of high cholesterol and intolerance to statins. She consulted Dr. Bloch in 2016 and it looks like Repatha was briefly discussed but not prescribed at that time.  She would like to have a CT cardiac score.  She has tried Zetia without success.    She is currently taking quetiapine for sleep and expresses concern about potential dependency.    She has been prescribed estrogen cream but is unsure of its application. She reports no issues with bladder leakage and is not currently sexually active due to pain during intercourse. She has not been using estrogen recently.  Her boyfriend lives in [place] and she only sees them about 3 times per year.    She reports no concerns about STDs, HIV, or hepatitis C. She also reports no breast pain.    SOCIAL HISTORY  The patient admits to smoking half a pack a day.    FAMILY HISTORY  The patient mentions that everyone in her family has high cholesterol and most of them can not take medication due to allergies.    ALLERGIES  The patient is intolerant to multiple STATINS.    MEDICATIONS  Current: quetiapine         meds:   Current Outpatient Medications   Medication Sig Dispense Refill    nicotine (NICODERM) 14 MG/24HR PATCH 24 HR Place 1 Patch on the skin every 24 hours. 28 Patch 3    QUEtiapine (SEROQUEL) 50 MG tablet TAKE 1 TABLET BY MOUTH ONCE DAILY IN THE EVENING 45 MINUTES BEFORE BEDTIME AND ALLOW 8 HOURS FOR SLEEP 30 Tablet 5     No current facility-administered medications for this visit.       Allergies: No Known Allergies    family:   Family History   Problem Relation Age of Onset    Hypertension Mother     Hyperlipidemia Mother     Diabetes Mother         Dm type 1    Hypertension Father     Hyperlipidemia Father     Stroke Father 78    Cancer Father 79        Pancreatic    Cancer  Paternal Grandmother         Breast cancer mid 70s    Hyperlipidemia Sister        social hx:   Social History     Socioeconomic History    Marital status: Single     Spouse name: Not on file    Number of children: 1    Years of education: Not on file    Highest education level: Associate degree: occupational, technical, or vocational program   Occupational History    Occupation:      Employer: [employer]   Tobacco Use    Smoking status: Every Day     Current packs/day: 0.00     Types: Cigarettes     Start date: 2016     Last attempt to quit: 2017     Years since quittin.0    Smokeless tobacco: Never    Tobacco comments:     3 per day apprx   Substance and Sexual Activity    Alcohol use: Yes     Comment: occasional    Drug use: No    Sexual activity: Not Currently     Comment:  passed away 2021   Other Topics Concern    Not on file   Social History Narrative    ** Merged History Encounter **          Social Drivers of Health     Financial Resource Strain: Low Risk  (3/24/2025)    Overall Financial Resource Strain (CARDIA)     Difficulty of Paying Living Expenses: Not very hard   Food Insecurity: No Food Insecurity (3/24/2025)    Hunger Vital Sign     Worried About Running Out of Food in the Last Year: Never true     Ran Out of Food in the Last Year: Never true   Transportation Needs: No Transportation Needs (3/24/2025)    PRAPARE - Transportation     Lack of Transportation (Medical): No     Lack of Transportation (Non-Medical): No   Physical Activity: Sufficiently Active (3/24/2025)    Exercise Vital Sign     Days of Exercise per Week: 5 days     Minutes of Exercise per Session: 60 min   Stress: No Stress Concern Present (3/24/2025)    [employer] [place] [employer] - Occupational Stress Questionnaire     Feeling of Stress : Only a little   Social Connections: Moderately Isolated (3/24/2025)    Social Connection and Isolation Panel [NHANES]     Frequency of Communication  "with Friends and Family: Once a week     Frequency of Social Gatherings with Friends and Family: Once a week     Attends [employer] Services: More than 4 times per year     Active Member of Clubs or Organizations: Yes     Attends Club or Organization Meetings: More than 4 times per year     Marital Status:    Intimate Partner Violence: Not on file   Housing Stability: Low Risk  (3/24/2025)    Housing Stability Vital Sign     Unable to Pay for Housing in the Last Year: No     Number of Times Moved in the Last Year: 0     Homeless in the Last Year: No       PHYSICAL EXAMINATION:  /80 (BP Location: Left arm, Patient Position: Sitting, BP Cuff Size: Adult)   Pulse 82   Temp 36.2 °C (97.2 °F)   Resp 12   Ht 1.727 m (5' 8\")   Wt 65.8 kg (145 lb)   SpO2 98%   General appearance:healthy, well developed, well nourished  Psych: alert, no distress, cooperative  Eyes: EOM's normal, pupils equal, round, reactive to light  ENT: Ears: external ears normal to inspection and palpation, TM's clear, Nose/Sinuses: nose shows no deformity, asymmetry, or inflammation  Neck: no asymmetry, masses, or scars, no adenopathy, thyroid normal to palpation  Lungs:chest symmetric with normal A/P diameter, no chest deformities noted, normal respiratory rate and rhythm  Cardiovascular:regular rate and rhythm, S1 normal  Breasts: normal in size and symmetry, skin normal, physiologic fibronodularity  Abdomen: umbilicus normal, no masses palpable, no organomegaly  Musculoskeletal:ROM of all joints is normal, no evidence of joint instability  Lymphatic: None significantly enlarged  Skin: no rash, no edema  Neuro: mental status intact, cranial nerves 2-12 intact  Pelvic: external genitalia normal, cervix normal in appearance, bimanual exam reveals normal uterus, adnexa without masses or tenderness, vaginal mucosa normal      ASSESSMENT/PLAN:  1.annual physical exam: HCM:  Pap and breast exams done.  BSE technique reviewed and patient " encouraged to perform self-exam monthly.   Encourage daily exercise for at least 30 minutes  Recommend mammogram yearly.  Colonoscopy is up-to-date.  Mammograms up-to-date.  Encouraged her to see a dermatologist yearly.  Recommend 1500 mg Calcium with 600 units vit d daily.    Pap q 3 yrs if today's is normal.   2.  New onset headaches: Recommend CT of her head.  If CT is negative and headaches persist, can treat as if migraines with abortive sumatriptan.  May take ibuprofen or Tylenol in the meantime.  Encouraged high water intake and exercise.  3.  Insomnia: Controlled with quetiapine.  4.  Vaginal atrophy: Encouraged her to insert vaginal estrogen twice weekly and apply a small amount to the external area for elasticity.  5.  Smoking addiction: Discussed Chantix versus NicoDerm patches.  She prefers to start with the patch.  If she is doing well on the patch and wants to decrease the nicotine dosage at any time she can contact me.  Discussed that NicoDerm may not be covered by insurance and there is a potential she will need to buy this over-the-counter.  6.  Hyperlipidemia: Agreeable to CT cardiac score.  If cardiac score is above 100, recommend stress test and initiation of Repatha.

## 2025-03-31 LAB — THINPREP PAP, CYTOLOGY NL11781: NORMAL

## 2025-04-01 ENCOUNTER — RESULTS FOLLOW-UP (OUTPATIENT)
Dept: MEDICAL GROUP | Facility: LAB | Age: 65
End: 2025-04-01

## 2025-04-07 ENCOUNTER — HOSPITAL ENCOUNTER (OUTPATIENT)
Dept: RADIOLOGY | Facility: MEDICAL CENTER | Age: 65
End: 2025-04-07
Attending: NURSE PRACTITIONER
Payer: COMMERCIAL

## 2025-04-07 ENCOUNTER — RESULTS FOLLOW-UP (OUTPATIENT)
Dept: MEDICAL GROUP | Facility: LAB | Age: 65
End: 2025-04-07

## 2025-04-07 DIAGNOSIS — R51.9 NEW ONSET OF HEADACHES: ICD-10-CM

## 2025-04-07 DIAGNOSIS — E78.49 OTHER HYPERLIPIDEMIA: ICD-10-CM

## 2025-04-07 PROCEDURE — 4410556 CT-CARDIAC SCORING (SELF PAY ONLY)

## 2025-04-07 PROCEDURE — 70450 CT HEAD/BRAIN W/O DYE: CPT

## 2025-04-08 ENCOUNTER — RESULTS FOLLOW-UP (OUTPATIENT)
Dept: MEDICAL GROUP | Facility: LAB | Age: 65
End: 2025-04-08

## 2025-08-26 DIAGNOSIS — G47.09 OTHER INSOMNIA: ICD-10-CM

## 2025-08-27 RX ORDER — QUETIAPINE FUMARATE 50 MG/1
TABLET, FILM COATED ORAL
Qty: 30 TABLET | Refills: 0 | Status: SHIPPED | OUTPATIENT
Start: 2025-08-27